# Patient Record
Sex: FEMALE | Race: WHITE | NOT HISPANIC OR LATINO | Employment: PART TIME
[De-identification: names, ages, dates, MRNs, and addresses within clinical notes are randomized per-mention and may not be internally consistent; named-entity substitution may affect disease eponyms.]

---

## 2019-01-01 ENCOUNTER — EXTERNAL RECORD (OUTPATIENT)
Dept: HEALTH INFORMATION MANAGEMENT | Facility: OTHER | Age: 29
End: 2019-01-01

## 2019-09-25 ENCOUNTER — OFFICE VISIT (OUTPATIENT)
Dept: CARDIOLOGY | Age: 29
End: 2019-09-25

## 2019-09-25 VITALS
WEIGHT: 139.66 LBS | HEART RATE: 73 BPM | HEIGHT: 66 IN | BODY MASS INDEX: 22.45 KG/M2 | SYSTOLIC BLOOD PRESSURE: 108 MMHG | DIASTOLIC BLOOD PRESSURE: 68 MMHG

## 2019-09-25 DIAGNOSIS — Z13.220 LIPID SCREENING: ICD-10-CM

## 2019-09-25 DIAGNOSIS — Q25.1 COARCTATION OF AORTA: ICD-10-CM

## 2019-09-25 DIAGNOSIS — Q23.1 BICUSPID AORTIC VALVE: Primary | ICD-10-CM

## 2019-09-25 PROCEDURE — 99204 OFFICE O/P NEW MOD 45 MIN: CPT | Performed by: INTERNAL MEDICINE

## 2019-09-25 PROCEDURE — 93000 ELECTROCARDIOGRAM COMPLETE: CPT | Performed by: INTERNAL MEDICINE

## 2019-09-25 SDOH — HEALTH STABILITY: PHYSICAL HEALTH: ON AVERAGE, HOW MANY DAYS PER WEEK DO YOU ENGAGE IN MODERATE TO STRENUOUS EXERCISE (LIKE A BRISK WALK)?: 3 DAYS

## 2019-09-25 SDOH — HEALTH STABILITY: PHYSICAL HEALTH: ON AVERAGE, HOW MANY MINUTES DO YOU ENGAGE IN EXERCISE AT THIS LEVEL?: 60 MIN

## 2019-09-25 ASSESSMENT — PATIENT HEALTH QUESTIONNAIRE - PHQ9
SUM OF ALL RESPONSES TO PHQ9 QUESTIONS 1 AND 2: 0
1. LITTLE INTEREST OR PLEASURE IN DOING THINGS: NOT AT ALL
2. FEELING DOWN, DEPRESSED OR HOPELESS: NOT AT ALL
SUM OF ALL RESPONSES TO PHQ9 QUESTIONS 1 AND 2: 0

## 2019-10-09 ENCOUNTER — HOSPITAL (OUTPATIENT)
Dept: OTHER | Age: 29
End: 2019-10-09
Attending: INTERNAL MEDICINE

## 2019-10-09 PROCEDURE — 93306 TTE W/DOPPLER COMPLETE: CPT | Performed by: INTERNAL MEDICINE

## 2019-10-24 ENCOUNTER — TELEPHONE (OUTPATIENT)
Dept: CARDIOLOGY | Age: 29
End: 2019-10-24

## 2019-10-30 ENCOUNTER — HOSPITAL (OUTPATIENT)
Dept: OTHER | Age: 29
End: 2019-10-30
Attending: OBSTETRICS & GYNECOLOGY

## 2019-10-30 PROCEDURE — 76811 OB US DETAILED SNGL FETUS: CPT | Performed by: OBSTETRICS & GYNECOLOGY

## 2019-10-30 PROCEDURE — 99244 OFF/OP CNSLTJ NEW/EST MOD 40: CPT | Performed by: OBSTETRICS & GYNECOLOGY

## 2019-10-30 PROCEDURE — 76817 TRANSVAGINAL US OBSTETRIC: CPT | Performed by: OBSTETRICS & GYNECOLOGY

## 2019-10-31 ENCOUNTER — TELEPHONE (OUTPATIENT)
Dept: CARDIOLOGY | Age: 29
End: 2019-10-31

## 2019-10-31 DIAGNOSIS — Q25.1 COARCTATION OF AORTA: Primary | ICD-10-CM

## 2019-10-31 DIAGNOSIS — Q23.1 BICUSPID AORTIC VALVE: ICD-10-CM

## 2019-11-13 ENCOUNTER — HOSPITAL (OUTPATIENT)
Dept: OTHER | Age: 29
End: 2019-11-13
Attending: NURSE PRACTITIONER

## 2019-11-18 DIAGNOSIS — Q23.1 BICUSPID AORTIC VALVE: ICD-10-CM

## 2019-11-18 DIAGNOSIS — Q25.1 COARCTATION OF AORTA: ICD-10-CM

## 2019-11-19 ENCOUNTER — TELEPHONE (OUTPATIENT)
Dept: CARDIOLOGY | Age: 29
End: 2019-11-19

## 2019-11-20 ENCOUNTER — HOSPITAL (OUTPATIENT)
Dept: OTHER | Age: 29
End: 2019-11-20
Attending: OBSTETRICS & GYNECOLOGY

## 2019-11-20 PROCEDURE — 76827 ECHO EXAM OF FETAL HEART: CPT | Performed by: PEDIATRICS

## 2019-11-20 PROCEDURE — 93325 DOPPLER ECHO COLOR FLOW MAPG: CPT | Performed by: PEDIATRICS

## 2019-11-20 PROCEDURE — 76825 ECHO EXAM OF FETAL HEART: CPT | Performed by: PEDIATRICS

## 2019-11-22 ENCOUNTER — HOSPITAL (OUTPATIENT)
Dept: OTHER | Age: 29
End: 2019-11-22
Attending: OBSTETRICS & GYNECOLOGY

## 2019-11-22 LAB
FETAL FIBRONECTIN: NEGATIVE
UA APPEAR: CLEAR
UA BACTERIA: ABNORMAL
UA BILI: NEGATIVE
UA BLOOD: ABNORMAL
UA COLOR: YELLOW
UA EPITHELIAL: ABNORMAL
UA GLUCOSE: NEGATIVE
UA KETONES: NEGATIVE
UA LEUK EST: ABNORMAL
UA NITRITE: NEGATIVE
UA PH: 7.5 (ref 5–7)
UA PROTEIN: NEGATIVE
UA RBC: ABNORMAL
UA SPEC GRAV: <=1.005 (ref 1.01–1.02)
UA UROBILINOGEN: 0.2 MG/DL (ref 0.2–1)
UA WBC: ABNORMAL

## 2019-11-25 ENCOUNTER — HOSPITAL (OUTPATIENT)
Dept: OTHER | Age: 29
End: 2019-11-25
Attending: OBSTETRICS & GYNECOLOGY

## 2019-11-25 PROCEDURE — 76816 OB US FOLLOW-UP PER FETUS: CPT | Performed by: OBSTETRICS & GYNECOLOGY

## 2019-11-25 PROCEDURE — 99213 OFFICE O/P EST LOW 20 MIN: CPT | Performed by: OBSTETRICS & GYNECOLOGY

## 2019-11-26 ENCOUNTER — OFFICE VISIT (OUTPATIENT)
Dept: PEDIATRIC CARDIOLOGY | Age: 29
End: 2019-11-26

## 2019-11-26 ENCOUNTER — ANCILLARY PROCEDURE (OUTPATIENT)
Dept: PEDIATRIC CARDIOLOGY | Age: 29
End: 2019-11-26
Attending: PEDIATRICS

## 2019-11-26 VITALS
SYSTOLIC BLOOD PRESSURE: 144 MMHG | OXYGEN SATURATION: 100 % | HEART RATE: 88 BPM | WEIGHT: 150.5 LBS | BODY MASS INDEX: 24.29 KG/M2 | DIASTOLIC BLOOD PRESSURE: 91 MMHG

## 2019-11-26 DIAGNOSIS — Q23.1 BICUSPID AORTIC VALVE: ICD-10-CM

## 2019-11-26 DIAGNOSIS — Q25.1 COARCTATION OF AORTA: Primary | ICD-10-CM

## 2019-11-26 LAB
AORTIC ROOT: 2.83 CM (ref 2.2–3.12)
AORTIC VALVE ANNULUS: 2.05 CM (ref 1.56–2.27)
FRACTIONAL SHORTENING: 29 % (ref 28–44)
LEFT VENTRICLE END SYSTOLIC SEPTAL THICKNESS: 1.09 CM
LEFT VENTRICULAR POSTERIOR WALL IN END DIASTOLE (LVPW): 0.68 CM (ref 0.48–0.92)
LEFT VENTRICULAR POSTERIOR WALL IN END SYSTOLE: 1.05 CM
LV SHORT-AXIS END-DIASTOLIC ENDOCARDIAL DIAMETER: 5.27 CM (ref 4.07–5.73)
LV SHORT-AXIS END-DIASTOLIC SEPTAL THICKNESS: 0.68 CM (ref 0.5–0.93)
LV SHORT-AXIS END-SYSTOLIC ENDOCARDIAL DIAMETER: 3.74 CM
LV THICKNESS:DIMENSION RATIO: 0.13 CM (ref 0.09–0.21)
RIGHT VENTRICULAR END DIASTOLIC DIAS: 1.2 CM
SINOTUBULAR JUNCTION: 2.24 CM (ref 1.78–2.59)
Z SCORE OF AORTIC VALVE ANNULUS PHN: 0.8 CM
Z SCORE OF LEFT VENTRICULAR POSTERIOR WALL IN END DIASTOLE: -0.2 CM
Z SCORE OF LV SHORT-AXIS END-DIASTOLIC ENDOCARDIAL DIAMETER: 0.9 CM
Z SCORE OF LV SHORT-AXIS END-DIASTOLIC SEPTAL THICKNESS: -0.3 CM
Z SCORE OF LV THICKNESS:DIMENSION RATIO: -0.7
Z-SCORE OF AORTIC ROOT: 0.7 CM
Z-SCORE OF SINOTUBULAR JUNCTION PHN: 0.3 CM

## 2019-11-26 PROCEDURE — 99243 OFF/OP CNSLTJ NEW/EST LOW 30: CPT | Performed by: PEDIATRICS

## 2019-11-26 PROCEDURE — 93304 ECHO TRANSTHORACIC: CPT | Performed by: PEDIATRICS

## 2019-11-26 PROCEDURE — 93325 DOPPLER ECHO COLOR FLOW MAPG: CPT | Performed by: PEDIATRICS

## 2019-11-26 PROCEDURE — 93321 DOPPLER ECHO F-UP/LMTD STD: CPT | Performed by: PEDIATRICS

## 2019-11-26 ASSESSMENT — ENCOUNTER SYMPTOMS
NEUROLOGICAL NEGATIVE: 1
CONSTITUTIONAL NEGATIVE: 1
RESPIRATORY NEGATIVE: 1
HEMATOLOGIC/LYMPHATIC NEGATIVE: 1
ALLERGIC/IMMUNOLOGIC NEGATIVE: 1
GASTROINTESTINAL NEGATIVE: 1
PSYCHIATRIC NEGATIVE: 1
ENDOCRINE NEGATIVE: 1
EYES NEGATIVE: 1

## 2019-12-03 ENCOUNTER — TELEPHONE (OUTPATIENT)
Dept: PEDIATRIC CARDIOLOGY | Age: 29
End: 2019-12-03

## 2019-12-16 ENCOUNTER — HOSPITAL (OUTPATIENT)
Dept: OTHER | Age: 29
End: 2019-12-16
Attending: OBSTETRICS & GYNECOLOGY

## 2019-12-16 LAB
UA APPEAR: ABNORMAL
UA BACTERIA: ABNORMAL
UA BILI: NEGATIVE
UA BLOOD: ABNORMAL
UA COLOR: ABNORMAL
UA EPITHELIAL: ABNORMAL
UA GLUCOSE: NEGATIVE
UA KETONES: NEGATIVE
UA LEUK EST: ABNORMAL
UA NITRITE: NEGATIVE
UA PH: 7 (ref 5–7)
UA PROTEIN: ABNORMAL
UA RBC: ABNORMAL
UA SPEC GRAV: 1.01 (ref 1.01–1.02)
UA UROBILINOGEN: 0.2 MG/DL (ref 0.2–1)
UA WBC: ABNORMAL

## 2019-12-23 ENCOUNTER — HOSPITAL (OUTPATIENT)
Dept: OTHER | Age: 29
End: 2019-12-23
Attending: OBSTETRICS & GYNECOLOGY

## 2019-12-23 PROCEDURE — 76816 OB US FOLLOW-UP PER FETUS: CPT | Performed by: OBSTETRICS & GYNECOLOGY

## 2019-12-23 PROCEDURE — 99213 OFFICE O/P EST LOW 20 MIN: CPT | Performed by: OBSTETRICS & GYNECOLOGY

## 2020-01-19 ENCOUNTER — HOSPITAL (OUTPATIENT)
Dept: OTHER | Age: 30
End: 2020-01-19
Attending: SPECIALIST

## 2020-01-20 ENCOUNTER — HOSPITAL (OUTPATIENT)
Dept: OTHER | Age: 30
End: 2020-01-20

## 2020-01-27 ENCOUNTER — ANCILLARY PROCEDURE (OUTPATIENT)
Dept: PEDIATRIC CARDIOLOGY | Age: 30
End: 2020-01-27
Attending: PEDIATRICS

## 2020-01-27 ENCOUNTER — OFFICE VISIT (OUTPATIENT)
Dept: PEDIATRIC CARDIOLOGY | Age: 30
End: 2020-01-27

## 2020-01-27 VITALS
WEIGHT: 158 LBS | OXYGEN SATURATION: 100 % | SYSTOLIC BLOOD PRESSURE: 119 MMHG | BODY MASS INDEX: 25.39 KG/M2 | HEART RATE: 93 BPM | RESPIRATION RATE: 21 BRPM | HEIGHT: 66 IN | DIASTOLIC BLOOD PRESSURE: 62 MMHG

## 2020-01-27 DIAGNOSIS — Q25.1 COARCTATION OF AORTA: ICD-10-CM

## 2020-01-27 DIAGNOSIS — Q23.1 BICUSPID AORTIC VALVE: Primary | ICD-10-CM

## 2020-01-27 LAB
AORTIC ROOT: 3.24 CM (ref 2.31–3.28)
AORTIC VALVE ANNULUS: 1.95 CM (ref 1.63–2.38)
FRACTIONAL SHORTENING: 34 % (ref 28–44)
LEFT VENTRICULAR POSTERIOR WALL IN END DIASTOLE (LVPW): 0.99 CM (ref 0.5–0.95)
LV SHORT-AXIS END-DIASTOLIC ENDOCARDIAL DIAMETER: 5.71 CM (ref 4.25–5.98)
LV SHORT-AXIS END-DIASTOLIC SEPTAL THICKNESS: 0.84 CM (ref 0.52–0.96)
LV SHORT-AXIS END-SYSTOLIC ENDOCARDIAL DIAMETER: 3.79 CM
LV THICKNESS:DIMENSION RATIO: 0.17 CM (ref 0.09–0.21)
LVOT 2D: 2 CM
RIGHT VENTRICULAR END DIASTOLIC DIAS: 1.33 CM
Z SCORE OF AORTIC VALVE ANNULUS PHN: -0.3 CM
Z SCORE OF LEFT VENTRICULAR POSTERIOR WALL IN END DIASTOLE: 2.3 CM
Z SCORE OF LV SHORT-AXIS END-DIASTOLIC ENDOCARDIAL DIAMETER: 1.4 CM
Z SCORE OF LV SHORT-AXIS END-DIASTOLIC SEPTAL THICKNESS: 0.9 CM
Z SCORE OF LV THICKNESS:DIMENSION RATIO: 0.8
Z-SCORE OF AORTIC ROOT: 1.8 CM

## 2020-01-27 PROCEDURE — 99215 OFFICE O/P EST HI 40 MIN: CPT | Performed by: PEDIATRICS

## 2020-01-27 PROCEDURE — 93000 ELECTROCARDIOGRAM COMPLETE: CPT | Performed by: PEDIATRICS

## 2020-01-27 PROCEDURE — 93304 ECHO TRANSTHORACIC: CPT | Performed by: PEDIATRICS

## 2020-01-27 PROCEDURE — 93321 DOPPLER ECHO F-UP/LMTD STD: CPT | Performed by: PEDIATRICS

## 2020-01-27 PROCEDURE — 93325 DOPPLER ECHO COLOR FLOW MAPG: CPT | Performed by: PEDIATRICS

## 2020-01-27 RX ORDER — NIFEDIPINE 10 MG/1
10 CAPSULE ORAL 4 TIMES DAILY
COMMUNITY
Start: 2020-01-22 | End: 2022-11-09 | Stop reason: ALTCHOICE

## 2020-01-27 ASSESSMENT — ENCOUNTER SYMPTOMS
GASTROINTESTINAL NEGATIVE: 1
EYES NEGATIVE: 1
PSYCHIATRIC NEGATIVE: 1
NEUROLOGICAL NEGATIVE: 1
RESPIRATORY NEGATIVE: 1
ENDOCRINE NEGATIVE: 1
ALLERGIC/IMMUNOLOGIC NEGATIVE: 1
CONSTITUTIONAL NEGATIVE: 1
HEMATOLOGIC/LYMPHATIC NEGATIVE: 1

## 2020-07-21 ENCOUNTER — TELEPHONE (OUTPATIENT)
Dept: PEDIATRIC CARDIOLOGY | Age: 30
End: 2020-07-21

## 2021-02-17 ENCOUNTER — OFFICE VISIT (OUTPATIENT)
Dept: PEDIATRIC CARDIOLOGY | Age: 31
End: 2021-02-17

## 2021-02-17 ENCOUNTER — ANCILLARY PROCEDURE (OUTPATIENT)
Dept: PEDIATRIC CARDIOLOGY | Age: 31
End: 2021-02-17
Attending: PEDIATRICS

## 2021-02-17 VITALS
DIASTOLIC BLOOD PRESSURE: 62 MMHG | BODY MASS INDEX: 20.54 KG/M2 | OXYGEN SATURATION: 99 % | WEIGHT: 127.8 LBS | SYSTOLIC BLOOD PRESSURE: 116 MMHG | HEART RATE: 60 BPM | HEIGHT: 66 IN

## 2021-02-17 DIAGNOSIS — Q25.1 COARCTATION OF AORTA: ICD-10-CM

## 2021-02-17 DIAGNOSIS — Q23.1 BICUSPID AORTIC VALVE: Primary | ICD-10-CM

## 2021-02-17 LAB
AORTIC ROOT: 3.02 CM (ref 2.18–3.1)
AORTIC VALVE ANNULUS: 1.97 CM (ref 1.54–2.25)
FRACTIONAL SHORTENING MMODE: 41 %
IVSS (M-MODE): 1.33 CM
LEFT VENTRICULAR POSTERIOR WALL IN END DIASTOLE MMODE: 0.68 CM (ref 0.48–0.91)
LEFT VENTRICULAR POSTERIOR WALL SYSTOLE MMODE: 1.26 CM
LV END-DIASTOLIC ENDOCARDIAL DIAMETER MMODE: 5.64 CM (ref 4.04–5.68)
LV END-DIASTOLIC SEPTAL THICKNESS MMODE: 0.68 CM (ref 0.49–0.92)
LVIDS BY MMODE: 3.33 CM
SINOTUBULAR JUNCTION: 2.46 CM (ref 1.76–2.57)
Z SCORE OF AORTIC VALVE ANNULUS PHN: 0.4 CM
Z SCORE OF LEFT VENTRICULAR POSTERIOR WALL IN END DIASTOLE MMODE: -0.1 CM
Z SCORE OF LV END-DIASTOLIC ENDOCARDIAL DIAMETER MMODE: 1.9 CM
Z SCORE OF LV END-DIASTOLIC SEPTAL THICKNESS MMODE: -0.2 CM
Z-SCORE OF AORTIC ROOT: 1.6 CM
Z-SCORE OF SINOTUBULAR JUNCTION PHN: 1.4 CM

## 2021-02-17 PROCEDURE — 93325 DOPPLER ECHO COLOR FLOW MAPG: CPT | Performed by: PEDIATRICS

## 2021-02-17 PROCEDURE — 99214 OFFICE O/P EST MOD 30 MIN: CPT | Performed by: PEDIATRICS

## 2021-02-17 PROCEDURE — 93321 DOPPLER ECHO F-UP/LMTD STD: CPT | Performed by: PEDIATRICS

## 2021-02-17 PROCEDURE — 93304 ECHO TRANSTHORACIC: CPT | Performed by: PEDIATRICS

## 2021-02-17 PROCEDURE — 93225 XTRNL ECG REC<48 HRS REC: CPT | Performed by: PEDIATRICS

## 2021-02-17 PROCEDURE — 93000 ELECTROCARDIOGRAM COMPLETE: CPT | Performed by: PEDIATRICS

## 2021-02-17 ASSESSMENT — ENCOUNTER SYMPTOMS
GASTROINTESTINAL NEGATIVE: 1
RESPIRATORY NEGATIVE: 1
HEMATOLOGIC/LYMPHATIC NEGATIVE: 1
PSYCHIATRIC NEGATIVE: 1
ALLERGIC/IMMUNOLOGIC NEGATIVE: 1
CONSTITUTIONAL NEGATIVE: 1
ENDOCRINE NEGATIVE: 1
EYES NEGATIVE: 1
NEUROLOGICAL NEGATIVE: 1

## 2021-02-18 ENCOUNTER — TELEPHONE (OUTPATIENT)
Dept: PEDIATRIC CARDIOLOGY | Age: 31
End: 2021-02-18

## 2021-02-18 DIAGNOSIS — Q23.1 BICUSPID AORTIC VALVE: Primary | ICD-10-CM

## 2021-02-18 DIAGNOSIS — Q25.1 COARCTATION OF AORTA: ICD-10-CM

## 2021-03-29 PROCEDURE — 93227 XTRNL ECG REC<48 HR R&I: CPT | Performed by: PEDIATRICS

## 2021-06-14 ENCOUNTER — IMMUNIZATION (OUTPATIENT)
Dept: LAB | Age: 31
End: 2021-06-14

## 2021-06-14 DIAGNOSIS — Z23 NEED FOR VACCINATION: Primary | ICD-10-CM

## 2021-06-14 PROCEDURE — 91301 COVID-19 MODERNA VACCINE: CPT | Performed by: HOSPITALIST

## 2021-06-14 PROCEDURE — 0011A COVID-19 MODERNA VACCINE: CPT | Performed by: HOSPITALIST

## 2021-07-12 ENCOUNTER — IMMUNIZATION (OUTPATIENT)
Dept: LAB | Age: 31
End: 2021-07-12
Attending: HOSPITALIST

## 2021-07-12 DIAGNOSIS — Z23 NEED FOR VACCINATION: Primary | ICD-10-CM

## 2021-07-12 PROCEDURE — 91301 COVID-19 MODERNA VACCINE: CPT

## 2021-07-12 PROCEDURE — 0012A COVID-19 MODERNA VACCINE: CPT

## 2021-10-27 ENCOUNTER — HOSPITAL ENCOUNTER (OUTPATIENT)
Dept: MRI IMAGING | Age: 31
Discharge: HOME | End: 2021-10-27
Attending: PEDIATRICS

## 2021-10-27 DIAGNOSIS — Q25.1 COARCTATION OF AORTA: ICD-10-CM

## 2021-10-27 DIAGNOSIS — Q23.1 BICUSPID AORTIC VALVE: ICD-10-CM

## 2021-10-27 PROCEDURE — 10002805 HB CONTRAST AGENT: Performed by: PEDIATRICS

## 2021-10-27 PROCEDURE — A9585 GADOBUTROL INJECTION: HCPCS | Performed by: PEDIATRICS

## 2021-10-27 PROCEDURE — 75561 CARDIAC MRI FOR MORPH W/DYE: CPT

## 2021-10-27 PROCEDURE — 75561 CARDIAC MRI FOR MORPH W/DYE: CPT | Performed by: PEDIATRICS

## 2021-10-27 PROCEDURE — 71555 MRI ANGIO CHEST W OR W/O DYE: CPT

## 2021-10-27 PROCEDURE — 71555 MRI ANGIO CHEST W OR W/O DYE: CPT | Performed by: PEDIATRICS

## 2021-10-27 RX ORDER — GADOBUTROL 604.72 MG/ML
7.5 INJECTION INTRAVENOUS ONCE
Status: COMPLETED | OUTPATIENT
Start: 2021-10-27 | End: 2021-10-27

## 2021-10-27 RX ADMIN — GADOBUTROL 5 ML: 604.72 INJECTION INTRAVENOUS at 10:52

## 2021-11-04 ENCOUNTER — TELEPHONE (OUTPATIENT)
Dept: PEDIATRIC CARDIOLOGY | Age: 31
End: 2021-11-04

## 2021-11-11 ENCOUNTER — TELEPHONE (OUTPATIENT)
Dept: PEDIATRIC CARDIOLOGY | Age: 31
End: 2021-11-11

## 2021-12-23 ENCOUNTER — TELEPHONE (OUTPATIENT)
Dept: FAMILY MEDICINE CLINIC | Facility: CLINIC | Age: 31
End: 2021-12-23

## 2021-12-23 ENCOUNTER — OFFICE VISIT (OUTPATIENT)
Dept: FAMILY MEDICINE CLINIC | Facility: CLINIC | Age: 31
End: 2021-12-23
Payer: COMMERCIAL

## 2021-12-23 VITALS
HEART RATE: 96 BPM | OXYGEN SATURATION: 96 % | RESPIRATION RATE: 16 BRPM | HEIGHT: 66 IN | DIASTOLIC BLOOD PRESSURE: 60 MMHG | SYSTOLIC BLOOD PRESSURE: 98 MMHG | BODY MASS INDEX: 21.18 KG/M2 | WEIGHT: 131.81 LBS

## 2021-12-23 DIAGNOSIS — M54.2 NECK PAIN: ICD-10-CM

## 2021-12-23 DIAGNOSIS — G44.209 TENSION HEADACHE: ICD-10-CM

## 2021-12-23 DIAGNOSIS — Q25.1 COARCTATION OF AORTA: ICD-10-CM

## 2021-12-23 DIAGNOSIS — G43.109 OCULAR MIGRAINE: Primary | ICD-10-CM

## 2021-12-23 DIAGNOSIS — Z00.00 ADULT GENERAL MEDICAL EXAMINATION: ICD-10-CM

## 2021-12-23 DIAGNOSIS — Z23 ENCOUNTER FOR IMMUNIZATION: ICD-10-CM

## 2021-12-23 PROCEDURE — 90686 IIV4 VACC NO PRSV 0.5 ML IM: CPT | Performed by: NURSE PRACTITIONER

## 2021-12-23 PROCEDURE — 99204 OFFICE O/P NEW MOD 45 MIN: CPT | Performed by: NURSE PRACTITIONER

## 2021-12-23 PROCEDURE — 3074F SYST BP LT 130 MM HG: CPT | Performed by: NURSE PRACTITIONER

## 2021-12-23 PROCEDURE — 90471 IMMUNIZATION ADMIN: CPT | Performed by: NURSE PRACTITIONER

## 2021-12-23 PROCEDURE — 3078F DIAST BP <80 MM HG: CPT | Performed by: NURSE PRACTITIONER

## 2021-12-23 PROCEDURE — 3008F BODY MASS INDEX DOCD: CPT | Performed by: NURSE PRACTITIONER

## 2021-12-23 RX ORDER — NAPROXEN 500 MG/1
500 TABLET ORAL 2 TIMES DAILY WITH MEALS
Qty: 28 TABLET | Refills: 0 | Status: SHIPPED | OUTPATIENT
Start: 2021-12-23 | End: 2022-01-06

## 2021-12-23 RX ORDER — ACETAMINOPHEN 500 MG
500 TABLET ORAL
COMMUNITY
Start: 2020-02-15

## 2021-12-23 RX ORDER — ETONOGESTREL 68 MG/1
IMPLANT SUBCUTANEOUS
COMMUNITY

## 2021-12-23 NOTE — PROGRESS NOTES
Chief Complaint:   Patient presents with:  Headache: started school online Octob 2021,  headaches began soon after. Then also experiencing blurry vision that will later turn into a headache.         HPI:   This is a 36year old female coming in to establish Grandmother    • Other (ALZHEIMER) Maternal Grandfather    • No Known Problems Paternal Grandmother    • Other (EMPHYSEMA-SMOKER) Paternal Grandfather      Allergies:  No Known Allergies  Current Meds:  Current Outpatient Medications   Medication Sig Dispe discharge Ears: External normal, TM clear bilaterally. Nose: patent, no nasal discharge. Throat:  No tonsillar erythema or exudate. Mouth:  No oral lesions, good dentition. NECK: Supple, no CLAD, no thyromegaly.  Trapezius muscles BL very tight on palpati COMP METABOLIC PANEL (14); Future  - LIPID PANEL; Future  - HEMOGLOBIN A1C; Future  - ASSAY, THYROID STIM HORMONE; Future  - OPHTHALMOLOGY - INTERNAL    6.  Encounter for immunization  - FLULAVAL INFLUENZA VACCINE QUAD PRESERVATIVE FREE 0.5 ML      Meds & R

## 2021-12-23 NOTE — TELEPHONE ENCOUNTER
Appt notes: HA and eye pain. I spoke with pt, looking to establish care with a provider. At the time she scheduled appt 12/13, was having a lot of HA with eye pain, blurred spots in vision, light sensitivity.  HA started prior to Thanksgiving, around [facility]

## 2021-12-23 NOTE — PATIENT INSTRUCTIONS
Quadruped Arm-Reach Exercise       Do this exercise on your hands and knees. Keep your knees under your hips and your hands under your shoulders. Keep your spine in a neutral position (not arched or sagging). Keep your ears in line with your shoulders.  H elbows toward your buttocks. Hold for up to 5 seconds, then return to starting position.   · Repeat 3 times, or as directed by your healthcare provider or physical therapist.  Meaghan Pope last reviewed this educational content on 7/1/2020  © [id number] The [employer] professional's instructions. Neck Exercises: Active Neck Rotation    To start, lie on your back, knees bent and feet flat on the floor. Keep your ears, shoulders, and hips aligned, but don’t press your lower back to the floor.  Rest your hands on you instructions. Neck Exercises: Head Lifts    Do this exercise on your hands and knees. Keep your knees under your hips and your hands under your shoulders. Keep your spine in a neutral position (not arched or sagging).  Keep your ears in line with you your lower back. Place your right palm on the top of your head. · Gently pull your head forward and down until you feel a stretch in the muscles in the back of your neck. Don’t force the motion. · Hold for 20 seconds, then return to starting position.  Sw symptoms (or aura) such as flashes of light, blind spots or other vision changes, warns you a headache is coming on.  Nausea and vomiting, lightheadedness, sensitivity to light or sound, and other visual disturbances are common migraine symptoms. The pain m and meditation. Talk with your healthcare provider to find out more information about managing stress. Many books and digital media are also available on this subject. Tyramine is a substance found in many foods. It can trigger a migraine in some people. you get a bad headache. This may keep you from having to visit the emergency department in the future. You may need to see a headache specialist (neurologist) if you continue to have headaches.   When to seek medical advice  Call your healthcare provider ri often along with nausea. You may be highly sensitive to light, sound, and odors. Vomiting or diarrhea may also happen. This stage lasts 4 to 72 hours. · Postdrome. After your headache ends, you may feel tired, achy, and \"washed out. \" This may last for a migraines. A migraine is a type of severe headache. Having a migraine can be very painful. But there are steps you can take to help prevent migraines.    Medicines to help prevent migraines   · Your healthcare provider may prescribe certain medicines to h substitute for professional medical care. Always follow your healthcare professional's instructions. Preventing Migraine Headaches: Triggers  The first step in preventing migraines is to learn what triggers them.  You may then be able to control your avoided. For example, make sure to get enough rest and drink plenty of water while you're traveling. Make sure to carry a hat, sunglasses, and your medicines. Be alert for migraine symptoms, so you can treat a migraine early if it happens.   Ra last r

## 2021-12-28 ENCOUNTER — TELEPHONE (OUTPATIENT)
Dept: FAMILY MEDICINE CLINIC | Facility: CLINIC | Age: 31
End: 2021-12-28

## 2021-12-28 NOTE — TELEPHONE ENCOUNTER
Received vm from pt, said she was referred to ophthalmology but soonest available is March. Wants to know if OK to wait that long. Also said she has an aura with ocular migraines.  Also, wants to double check if OK to take naproxen while breast feeding, berry

## 2022-04-20 ENCOUNTER — TELEPHONE (OUTPATIENT)
Dept: PEDIATRIC CARDIOLOGY | Age: 32
End: 2022-04-20

## 2022-11-09 ENCOUNTER — APPOINTMENT (OUTPATIENT)
Dept: CT IMAGING | Age: 32
End: 2022-11-09

## 2022-11-09 ENCOUNTER — HOSPITAL ENCOUNTER (EMERGENCY)
Age: 32
Discharge: HOME | End: 2022-11-09
Attending: EMERGENCY MEDICINE

## 2022-11-09 VITALS
DIASTOLIC BLOOD PRESSURE: 66 MMHG | HEART RATE: 65 BPM | HEIGHT: 66 IN | SYSTOLIC BLOOD PRESSURE: 123 MMHG | RESPIRATION RATE: 18 BRPM | OXYGEN SATURATION: 99 % | BODY MASS INDEX: 20.63 KG/M2 | TEMPERATURE: 99.1 F

## 2022-11-09 DIAGNOSIS — R11.11 VOMITING WITHOUT NAUSEA, UNSPECIFIED VOMITING TYPE: ICD-10-CM

## 2022-11-09 DIAGNOSIS — S09.90XA INJURY OF HEAD, INITIAL ENCOUNTER: Primary | ICD-10-CM

## 2022-11-09 DIAGNOSIS — G44.319 ACUTE POST-TRAUMATIC HEADACHE, NOT INTRACTABLE: ICD-10-CM

## 2022-11-09 LAB — HCG UR QL: NEGATIVE

## 2022-11-09 PROCEDURE — 10002803 HB RX 637: Performed by: PHYSICIAN ASSISTANT

## 2022-11-09 PROCEDURE — 84703 CHORIONIC GONADOTROPIN ASSAY: CPT

## 2022-11-09 PROCEDURE — 96372 THER/PROPH/DIAG INJ SC/IM: CPT

## 2022-11-09 PROCEDURE — 99284 EMERGENCY DEPT VISIT MOD MDM: CPT

## 2022-11-09 PROCEDURE — 70450 CT HEAD/BRAIN W/O DYE: CPT

## 2022-11-09 PROCEDURE — 10002800 HB RX 250 W HCPCS: Performed by: PHYSICIAN ASSISTANT

## 2022-11-09 RX ORDER — SPIRONOLACTONE 50 MG/1
150 TABLET, FILM COATED ORAL
COMMUNITY

## 2022-11-09 RX ORDER — BUTALBITAL, ACETAMINOPHEN AND CAFFEINE 50; 325; 40 MG/1; MG/1; MG/1
1 TABLET ORAL EVERY 4 HOURS PRN
Status: DISCONTINUED | OUTPATIENT
Start: 2022-11-09 | End: 2022-11-09 | Stop reason: HOSPADM

## 2022-11-09 RX ORDER — ONDANSETRON 4 MG/1
4 TABLET, ORALLY DISINTEGRATING ORAL EVERY 8 HOURS PRN
Qty: 12 TABLET | Refills: 0 | Status: SHIPPED | OUTPATIENT
Start: 2022-11-09

## 2022-11-09 RX ORDER — ETONOGESTREL 68 MG/1
IMPLANT SUBCUTANEOUS
COMMUNITY

## 2022-11-09 RX ORDER — BUTALBITAL, ACETAMINOPHEN AND CAFFEINE 50; 325; 40 MG/1; MG/1; MG/1
1 TABLET ORAL EVERY 6 HOURS PRN
Qty: 12 TABLET | Refills: 0 | Status: SHIPPED | OUTPATIENT
Start: 2022-11-09

## 2022-11-09 RX ADMIN — BUTALBITAL, ACETAMINOPHEN AND CAFFEINE 1 TABLET: 50; 325; 40 TABLET ORAL at 15:06

## 2022-11-09 RX ADMIN — KETOROLAC TROMETHAMINE 30 MG: 30 INJECTION, SOLUTION INTRAMUSCULAR at 15:06

## 2022-11-09 ASSESSMENT — ENCOUNTER SYMPTOMS
BACK PAIN: 0
NAUSEA: 1
VOMITING: 1
ABDOMINAL PAIN: 0
SHORTNESS OF BREATH: 0
HEADACHES: 1
DIZZINESS: 1
CHILLS: 1
FEVER: 0

## 2022-11-09 ASSESSMENT — MOVEMENT AND STRENGTH ASSESSMENTS: ALL_EXTREMITIES: EQUAL STRENGTH/TONE/MOVEMENT

## 2022-11-09 ASSESSMENT — PAIN SCALES - GENERAL: PAINLEVEL_OUTOF10: 6

## 2023-07-03 ENCOUNTER — OFFICE VISIT (OUTPATIENT)
Dept: FAMILY MEDICINE CLINIC | Facility: CLINIC | Age: 33
End: 2023-07-03
Payer: COMMERCIAL

## 2023-07-03 VITALS
DIASTOLIC BLOOD PRESSURE: 80 MMHG | WEIGHT: 134.19 LBS | OXYGEN SATURATION: 98 % | TEMPERATURE: 98 F | HEART RATE: 90 BPM | SYSTOLIC BLOOD PRESSURE: 122 MMHG | HEIGHT: 66 IN | BODY MASS INDEX: 21.57 KG/M2 | RESPIRATION RATE: 16 BRPM

## 2023-07-03 DIAGNOSIS — R00.2 PALPITATIONS: ICD-10-CM

## 2023-07-03 DIAGNOSIS — Q25.1 COARCTATION OF AORTA: ICD-10-CM

## 2023-07-03 DIAGNOSIS — F41.8 SITUATIONAL ANXIETY: ICD-10-CM

## 2023-07-03 DIAGNOSIS — Z00.00 ADULT GENERAL MEDICAL EXAMINATION: Primary | ICD-10-CM

## 2023-07-03 RX ORDER — TRIFAROTENE 50 UG/G
CREAM TOPICAL
COMMUNITY

## 2023-07-03 RX ORDER — DAPSONE 50 MG/G
GEL TOPICAL 2 TIMES DAILY
COMMUNITY

## 2023-11-13 ENCOUNTER — NURSE TRIAGE (OUTPATIENT)
Dept: FAMILY MEDICINE CLINIC | Facility: CLINIC | Age: 33
End: 2023-11-13

## 2023-11-13 NOTE — TELEPHONE ENCOUNTER
S/w Betsy stated right wrist pain x 2-3 months. Pt denied injury. Scheduled with Dr. Neil Rodriguez ( no Kristian Martinez appmartita) 11/15/23 at 4: 30 PM. Pt advised for severe pain, swelling or weakness to go to the ED. Pt voiced understanding. Reason for Disposition   MODERATE pain (e.g., interferes with normal activities) and present > 3 days    Answer Assessment - Initial Assessment Questions  1. ONSET: \"When did the pain start? \"      2-3 months  2. LOCATION: \"Where is the pain located? \"      Right wrist  3. PAIN: \"How bad is the pain? \" (Scale 1-10; or mild, moderate, severe)    - MILD (1-3): doesn't interfere with normal activities    - MODERATE (4-7): interferes with normal activities (e.g., work or school) or awakens from sleep    - SEVERE (8-10): excruciating pain, unable to use hand at all      2-3/10, trigger 5/10  4. WORK OR EXERCISE: \"Has there been any recent work or exercise that involved this part (i.e., hand or wrist) of the body? \"      Mousing hand and s/p using weed whacker  5. CAUSE: \"What do you think is causing the pain? \"      Unsure  6. AGGRAVATING FACTORS: \"What makes the pain worse? \" (e.g., using computer)      Moving wrist, stirring in pot or moving it excessively. 7. OTHER SYMPTOMS: \"Do you have any other symptoms? \" (e.g., neck pain, swelling, rash, numbness, fever)      Slight swelling  8. PREGNANCY: \"Is there any chance you are pregnant? \" \"When was your last menstrual period? \"      Denied; LMP: 11/2-11/9/23 abnormal    Protocols used: Hand and Wrist Pain-A-[place]

## 2023-11-15 ENCOUNTER — OFFICE VISIT (OUTPATIENT)
Dept: FAMILY MEDICINE CLINIC | Facility: CLINIC | Age: 33
End: 2023-11-15
Payer: COMMERCIAL

## 2023-11-15 VITALS
SYSTOLIC BLOOD PRESSURE: 102 MMHG | WEIGHT: 134.19 LBS | OXYGEN SATURATION: 99 % | HEART RATE: 77 BPM | DIASTOLIC BLOOD PRESSURE: 72 MMHG | BODY MASS INDEX: 21.57 KG/M2 | HEIGHT: 66 IN

## 2023-11-15 DIAGNOSIS — Z23 ENCOUNTER FOR IMMUNIZATION: ICD-10-CM

## 2023-11-15 DIAGNOSIS — M65.4 TENOSYNOVITIS, DE QUERVAIN: Primary | ICD-10-CM

## 2023-11-15 DIAGNOSIS — M25.531 WRIST PAIN, RIGHT: ICD-10-CM

## 2023-11-15 PROCEDURE — 3008F BODY MASS INDEX DOCD: CPT | Performed by: STUDENT IN AN ORGANIZED HEALTH CARE EDUCATION/TRAINING PROGRAM

## 2023-11-15 PROCEDURE — 90471 IMMUNIZATION ADMIN: CPT | Performed by: STUDENT IN AN ORGANIZED HEALTH CARE EDUCATION/TRAINING PROGRAM

## 2023-11-15 PROCEDURE — 3078F DIAST BP <80 MM HG: CPT | Performed by: STUDENT IN AN ORGANIZED HEALTH CARE EDUCATION/TRAINING PROGRAM

## 2023-11-15 PROCEDURE — 3074F SYST BP LT 130 MM HG: CPT | Performed by: STUDENT IN AN ORGANIZED HEALTH CARE EDUCATION/TRAINING PROGRAM

## 2023-11-15 PROCEDURE — 99213 OFFICE O/P EST LOW 20 MIN: CPT | Performed by: STUDENT IN AN ORGANIZED HEALTH CARE EDUCATION/TRAINING PROGRAM

## 2023-11-15 PROCEDURE — 90686 IIV4 VACC NO PRSV 0.5 ML IM: CPT | Performed by: STUDENT IN AN ORGANIZED HEALTH CARE EDUCATION/TRAINING PROGRAM

## 2023-11-21 ENCOUNTER — TELEPHONE (OUTPATIENT)
Dept: PEDIATRIC CARDIOLOGY | Age: 33
End: 2023-11-21

## 2023-11-21 ENCOUNTER — PATIENT MESSAGE (OUTPATIENT)
Dept: FAMILY MEDICINE CLINIC | Facility: CLINIC | Age: 33
End: 2023-11-21

## 2023-11-21 DIAGNOSIS — R79.89 ELEVATED LFTS: Primary | ICD-10-CM

## 2023-11-21 LAB
ABSOLUTE BASOPHILS: 28 CELLS/UL (ref 0–200)
ABSOLUTE EOSINOPHILS: 37 CELLS/UL (ref 15–500)
ABSOLUTE LYMPHOCYTES: 2916 CELLS/UL (ref 850–3900)
ABSOLUTE MONOCYTES: 552 CELLS/UL (ref 200–950)
ABSOLUTE NEUTROPHILS: 5667 CELLS/UL (ref 1500–7800)
ALBUMIN/GLOBULIN RATIO: 1.9 (CALC) (ref 1–2.5)
ALBUMIN: 4.9 G/DL (ref 3.6–5.1)
ALKALINE PHOSPHATASE: 37 U/L (ref 31–125)
ALT: 29 U/L (ref 6–29)
AST: 40 U/L (ref 10–30)
BASOPHILS: 0.3 %
BILIRUBIN, TOTAL: 1.6 MG/DL (ref 0.2–1.2)
BUN: 12 MG/DL (ref 7–25)
CALCIUM: 9.9 MG/DL (ref 8.6–10.2)
CARBON DIOXIDE: 27 MMOL/L (ref 20–32)
CHLORIDE: 103 MMOL/L (ref 98–110)
CHOL/HDLC RATIO: 2.4 (CALC)
CHOLESTEROL, TOTAL: 167 MG/DL
CREATININE: 0.89 MG/DL (ref 0.5–0.97)
EGFR: 89 ML/MIN/1.73M2
EOSINOPHILS: 0.4 %
GLOBULIN: 2.6 G/DL (CALC) (ref 1.9–3.7)
GLUCOSE: 75 MG/DL (ref 65–99)
HDL CHOLESTEROL: 69 MG/DL
HEMATOCRIT: 39.8 % (ref 35–45)
HEMOGLOBIN A1C: 4.8 % OF TOTAL HGB
HEMOGLOBIN: 13.9 G/DL (ref 11.7–15.5)
LDL-CHOLESTEROL: 84 MG/DL (CALC)
LYMPHOCYTES: 31.7 %
MCH: 30.7 PG (ref 27–33)
MCHC: 34.9 G/DL (ref 32–36)
MCV: 87.9 FL (ref 80–100)
MONOCYTES: 6 %
MPV: 10.7 FL (ref 7.5–12.5)
NEUTROPHILS: 61.6 %
NON-HDL CHOLESTEROL: 98 MG/DL (CALC)
PLATELET COUNT: 267 THOUSAND/UL (ref 140–400)
POTASSIUM: 4 MMOL/L (ref 3.5–5.3)
PROTEIN, TOTAL: 7.5 G/DL (ref 6.1–8.1)
RDW: 12.1 % (ref 11–15)
RED BLOOD CELL COUNT: 4.53 MILLION/UL (ref 3.8–5.1)
SODIUM: 139 MMOL/L (ref 135–146)
TRIGLYCERIDES: 60 MG/DL
TSH W/REFLEX TO FT4: 1.72 MIU/L
WHITE BLOOD CELL COUNT: 9.2 THOUSAND/UL (ref 3.8–10.8)

## 2023-12-02 ENCOUNTER — HOSPITAL ENCOUNTER (OUTPATIENT)
Dept: GENERAL RADIOLOGY | Facility: HOSPITAL | Age: 33
Discharge: HOME | End: 2023-12-02
Attending: STUDENT IN AN ORGANIZED HEALTH CARE EDUCATION/TRAINING PROGRAM
Payer: COMMERCIAL

## 2023-12-02 DIAGNOSIS — M25.531 WRIST PAIN, RIGHT: ICD-10-CM

## 2023-12-02 PROCEDURE — 73110 X-RAY EXAM OF WRIST: CPT | Performed by: STUDENT IN AN ORGANIZED HEALTH CARE EDUCATION/TRAINING PROGRAM

## 2023-12-28 PROBLEM — D03.72 MELANOMA IN SITU OF LEFT LOWER EXTREMITY INCLUDING HIP (HCC): Status: ACTIVE | Noted: 2023-12-28

## 2023-12-29 ENCOUNTER — NURSE TRIAGE (OUTPATIENT)
Dept: FAMILY MEDICINE CLINIC | Facility: CLINIC | Age: 33
End: 2023-12-29

## 2023-12-29 NOTE — TELEPHONE ENCOUNTER
S/w Sumi who stated after completion of final doxycyline dose she has been experiencing burning and a lump sensation lower throat and upper chest. Pt believes this is acid reflux. Pt denied chest pain or shortness of breath. Pt denied any other symptoms.      Pt advised if have worsening symptoms including throat tightness, SOB, chest pain or inability to swallow to seek ED evaluation.     Pt advised if have worsening symptoms prior to this appt then go to the .     No in-person appt until 1/3/24. Scheduled 1/3/24 at 1 PM.      Reason for Disposition   Sore throat is main symptom and persists > 48 hours    Answer Assessment - Initial Assessment Questions  1. ONSET: \"When did the throat start hurting?\" (Hours or days ago)       12/25/23  2. SEVERITY: \"How bad is the sore throat?\" (Scale 1-10; mild, moderate or severe)    - MILD (1-3):  doesn't interfere with eating or normal activities    - MODERATE (4-7): interferes with eating some solids and normal activities    - SEVERE (8-10):  excruciating pain, interferes with most normal activities    - SEVERE DYSPHAGIA: can't swallow liquids, drooling      4-5/10 but when swallow 8/10  3. STREP EXPOSURE: \"Has there been any exposure to strep within the past week?\" If Yes, ask: \"What type of contact occurred?\"       Denied  4.  VIRAL SYMPTOMS: \"Are there any symptoms of a cold, such as a runny nose, cough, hoarse voice or red eyes?\"       Denied  5. FEVER: \"Do you have a fever?\" If Yes, ask: \"What is your temperature, how was it measured, and when did it start?\"      Denied  6. PUS ON THE TONSILS: \"Is there pus on the tonsils in the back of your throat?\"      N/A  7. OTHER SYMPTOMS: \"Do you have any other symptoms?\" (e.g., difficulty breathing, headache, rash)      Burning base of throat radiated to downward.  8. PREGNANCY: \"Is there any chance you are pregnant?\" \"When was your last menstrual period?\"      Denied LMP: 12/28/23    Protocols used: Sore Throat-A-OH

## 2024-01-03 ENCOUNTER — OFFICE VISIT (OUTPATIENT)
Dept: FAMILY MEDICINE CLINIC | Facility: CLINIC | Age: 34
End: 2024-01-03
Payer: COMMERCIAL

## 2024-01-03 VITALS
WEIGHT: 129 LBS | DIASTOLIC BLOOD PRESSURE: 78 MMHG | HEIGHT: 66 IN | OXYGEN SATURATION: 99 % | BODY MASS INDEX: 20.73 KG/M2 | HEART RATE: 81 BPM | SYSTOLIC BLOOD PRESSURE: 108 MMHG

## 2024-01-03 DIAGNOSIS — K21.9 GASTROESOPHAGEAL REFLUX DISEASE WITHOUT ESOPHAGITIS: Primary | ICD-10-CM

## 2024-01-03 PROCEDURE — 99213 OFFICE O/P EST LOW 20 MIN: CPT | Performed by: STUDENT IN AN ORGANIZED HEALTH CARE EDUCATION/TRAINING PROGRAM

## 2024-01-03 PROCEDURE — 3078F DIAST BP <80 MM HG: CPT | Performed by: STUDENT IN AN ORGANIZED HEALTH CARE EDUCATION/TRAINING PROGRAM

## 2024-01-03 PROCEDURE — 3008F BODY MASS INDEX DOCD: CPT | Performed by: STUDENT IN AN ORGANIZED HEALTH CARE EDUCATION/TRAINING PROGRAM

## 2024-01-03 PROCEDURE — 3074F SYST BP LT 130 MM HG: CPT | Performed by: STUDENT IN AN ORGANIZED HEALTH CARE EDUCATION/TRAINING PROGRAM

## 2024-01-03 RX ORDER — SPIRONOLACTONE 50 MG/1
TABLET, FILM COATED ORAL
COMMUNITY
Start: 2023-12-09

## 2024-01-03 RX ORDER — OMEPRAZOLE 20 MG/1
20 CAPSULE, DELAYED RELEASE ORAL EVERY MORNING
Qty: 30 CAPSULE | Refills: 0 | Status: SHIPPED | OUTPATIENT
Start: 2024-01-03

## 2024-01-03 NOTE — PROGRESS NOTES
Subjective:   Sumi Guzman is a 33 year old female who presents for Sore Throat     33-year-old female complains of discomfort in esophageal area.  Recently saw dermatology, had 2 biopsies done (1 diagnosed with melanoma in situ) and was started on doxycycline prophylaxis.  Patient has also been alternating Tylenol and Motrin for pain postbiopsy.  Throughout the time of taking medicines patient noted acid reflux after eating that was at its worst when completing the course of antibiotics.  Tried to use more pillows at night to help with symptoms.  Since completing course of antibiotics on 12/25/2023 has been noting some improvement.  Trialed Gaviscon which helped relieve some of the symptoms.  Denies vomiting.    History/Other:    Chief Complaint Reviewed and Verified  No Further Nursing Notes to   Review  Tobacco Reviewed  Allergies Reviewed  Medications Reviewed    Problem List Reviewed  Medical History Reviewed  Surgical History   Reviewed  Family History Reviewed  Social History Reviewed         Tobacco:  She has never smoked tobacco.    Current Outpatient Medications   Medication Sig Dispense Refill    mupirocin 2 % External Ointment APPLY THIS ANTIBIOTIC TWICE A DAY TO SURGERY WOUND ON THE SCALP AND TOE      spironolactone 50 MG Oral Tab TAKE ONE BY MOUTH EVERY DAY X 2 WEEKS THEN INCREASE TO 2 BY MOUTH EVERY DAY      omeprazole 20 MG Oral Capsule Delayed Release Take 1 capsule (20 mg total) by mouth every morning. 30 capsule 0    Trifarotene (AKLIEF) 0.005 % External Cream       Dapsone 5 % External Gel Apply topically 2 (two) times daily.      Multiple Vitamin (MULTIVITAMIN ADULT OR) Take by mouth.      acetaminophen 500 MG Oral Tab 1 tablet (500 mg total).      Etonogestrel (NEXPLANON) 68 MG Subcutaneous Implant Nexplanon 68 mg subdermal implant           Review of Systems:  Review of Systems   Constitutional:  Negative for chills, diaphoresis and fever.   HENT:  Negative for congestion, ear  discharge, ear pain, sinus pressure, sinus pain and sore throat.    Eyes:  Negative for pain and discharge.   Respiratory:  Negative for cough, chest tightness, shortness of breath and wheezing.    Cardiovascular:  Negative for chest pain and palpitations.   Gastrointestinal:  Negative for abdominal pain, diarrhea, nausea and vomiting.        Acid reflux.   Endocrine: Negative for cold intolerance and heat intolerance.   Genitourinary:  Negative for dysuria, flank pain, frequency and urgency.   Musculoskeletal:  Negative for joint swelling.   Skin:  Negative for rash.   Neurological:  Negative for dizziness, syncope and headaches.   Psychiatric/Behavioral:  Negative for confusion and hallucinations.        Objective:   /78   Pulse 81   Ht 5' 6\" (1.676 m)   Wt 129 lb (58.5 kg)   LMP 12/28/2023 (Exact Date)   SpO2 99%   BMI 20.82 kg/m²  Estimated body mass index is 20.82 kg/m² as calculated from the following:    Height as of this encounter: 5' 6\" (1.676 m).    Weight as of this encounter: 129 lb (58.5 kg).  Physical Exam  Constitutional:       General: She is not in acute distress.     Appearance: Normal appearance. She is not ill-appearing or toxic-appearing.   HENT:      Head: Normocephalic and atraumatic.      Mouth/Throat:      Mouth: Mucous membranes are moist.      Pharynx: Oropharynx is clear. No oropharyngeal exudate or posterior oropharyngeal erythema.   Cardiovascular:      Rate and Rhythm: Normal rate and regular rhythm.      Heart sounds: Normal heart sounds. No murmur heard.     No gallop.   Pulmonary:      Effort: Pulmonary effort is normal. No respiratory distress.      Breath sounds: Normal breath sounds. No stridor. No wheezing, rhonchi or rales.   Abdominal:      General: Bowel sounds are normal.      Palpations: Abdomen is soft.      Tenderness: There is no abdominal tenderness. There is no guarding.   Skin:     General: Skin is warm and dry.   Neurological:      General: No focal  deficit present.      Mental Status: She is alert and oriented to person, place, and time. Mental status is at baseline.   Psychiatric:         Mood and Affect: Mood normal.         Behavior: Behavior normal.         Thought Content: Thought content normal.         Judgment: Judgment normal.         Assessment & Plan:     1. Gastroesophageal reflux disease without esophagitis (Primary)  Patient was recently on antibiotics (doxycycline) and using NSAIDs status post biopsies through dermatology.  -PPI for 2 to 4 weeks  -Dietary changes discussed  -Counseled patient about worrisome signs and symptoms that would prompt follow-up.  -     Omeprazole; Take 1 capsule (20 mg total) by mouth every morning.  Dispense: 30 capsule; Refill: 0          Return if symptoms worsen or fail to improve.    Adriel Oreilly MD, 1/3/2024, 1:09 PM

## 2024-02-01 DIAGNOSIS — K21.9 GASTROESOPHAGEAL REFLUX DISEASE WITHOUT ESOPHAGITIS: ICD-10-CM

## 2024-02-01 RX ORDER — OMEPRAZOLE 20 MG/1
20 CAPSULE, DELAYED RELEASE ORAL EVERY MORNING
Qty: 30 CAPSULE | Refills: 0 | Status: SHIPPED | OUTPATIENT
Start: 2024-02-01

## 2024-02-01 NOTE — TELEPHONE ENCOUNTER
A refill request was received for:  Requested Prescriptions     Pending Prescriptions Disp Refills    OMEPRAZOLE 20 MG Oral Capsule Delayed Release [Pharmacy Med Name: OMEPRAZOLE DR 20 MG CAPSULE] 30 capsule 0     Sig: TAKE 1 CAPSULE BY MOUTH EVERY DAY IN THE MORNING     Last refill date:  1/3/24  Qty: 30  Dx: GERD  Last office visit: 1/3/24   When is follow up due: Not documented     For hormone prescriptions, date of last blood test for rx being requested:    Future Appointments   Date Time Provider Department Center   7/8/2024  2:00 PM Sharee Bingham APRN [id number]         Omeprazole 20 mg, # 30 pended; authorize if appropriate.

## 2024-07-08 ENCOUNTER — OFFICE VISIT (OUTPATIENT)
Dept: FAMILY MEDICINE CLINIC | Facility: CLINIC | Age: 34
End: 2024-07-08
Payer: COMMERCIAL

## 2024-07-08 VITALS
DIASTOLIC BLOOD PRESSURE: 70 MMHG | SYSTOLIC BLOOD PRESSURE: 112 MMHG | WEIGHT: 132 LBS | HEART RATE: 71 BPM | TEMPERATURE: 97 F | OXYGEN SATURATION: 98 % | BODY MASS INDEX: 21.21 KG/M2 | HEIGHT: 66 IN

## 2024-07-08 DIAGNOSIS — M65.4 RADIAL STYLOID TENOSYNOVITIS (DE QUERVAIN): ICD-10-CM

## 2024-07-08 DIAGNOSIS — Q25.1 COARCTATION OF AORTA (HCC): ICD-10-CM

## 2024-07-08 DIAGNOSIS — Z00.00 ADULT GENERAL MEDICAL EXAMINATION: Primary | ICD-10-CM

## 2024-07-08 PROBLEM — H57.13 EYE PAIN, BILATERAL: Status: RESOLVED | Noted: 2023-03-20 | Resolved: 2024-07-08

## 2024-07-08 PROBLEM — H57.13 EYE PAIN, BILATERAL: Status: ACTIVE | Noted: 2023-03-20

## 2024-07-08 PROBLEM — H57.9 ITCHY EYES: Status: ACTIVE | Noted: 2023-03-20

## 2024-07-08 PROBLEM — Q23.81 BICUSPID AORTIC VALVE: Status: ACTIVE | Noted: 2023-11-16

## 2024-07-08 PROBLEM — Q23.1 BICUSPID AORTIC VALVE (HCC): Status: ACTIVE | Noted: 2023-11-16

## 2024-07-08 PROBLEM — H04.209 EPIPHORA: Status: ACTIVE | Noted: 2023-03-20

## 2024-07-08 NOTE — PROGRESS NOTES
Chief Complaint:   Chief Complaint   Patient presents with    Physical       HPI:   This is a 33 year old female coming in for physical. Feels generally well. 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 Admits diet and exercise have not been great but she is improving that now. Hx of R leigh Chema Carolina's in November 2023, this has improved but still bothers her at times. Has young kids. Pain now is intermittent. Has a brace at home but has not been using it.     Results for orders placed or performed in visit on 07/03/23   CBC With Differential With Platelet   Result Value Ref Range    WHITE BLOOD CELL COUNT 9.2 3.8 - 10.8 Thousand/uL    RED BLOOD CELL COUNT 4.53 3.80 - 5.10 Million/uL    HEMOGLOBIN 13.9 11.7 - 15.5 g/dL    HEMATOCRIT 39.8 35.0 - 45.0 %    MCV 87.9 80.0 - 100.0 fL    MCH 30.7 27.0 - 33.0 pg    MCHC 34.9 32.0 - 36.0 g/dL    RDW 12.1 11.0 - 15.0 %    PLATELET COUNT 267 140 - 400 Thousand/uL    MPV 10.7 7.5 - 12.5 fL    ABSOLUTE NEUTROPHILS 5,667 1,500 - 7,800 cells/uL    ABSOLUTE LYMPHOCYTES 2,916 850 - 3,900 cells/uL    ABSOLUTE MONOCYTES 552 200 - 950 cells/uL    ABSOLUTE EOSINOPHILS 37 15 - 500 cells/uL    ABSOLUTE BASOPHILS 28 0 - 200 cells/uL    NEUTROPHILS 61.6 %    LYMPHOCYTES 31.7 %    MONOCYTES 6.0 %    EOSINOPHILS 0.4 %    BASOPHILS 0.3 %   Comp Metabolic Panel (14)   Result Value Ref Range    GLUCOSE 75 65 - 99 mg/dL    UREA NITROGEN (BUN) 12 7 - 25 mg/dL    CREATININE 0.89 0.50 - 0.97 mg/dL    EGFR 89 > OR = 60 mL/min/1.73m2    BUN/CREATININE RATIO SEE NOTE: 6 - 22 (calc)    SODIUM 139 135 - 146 mmol/L    POTASSIUM 4.0 3.5 - 5.3 mmol/L    CHLORIDE 103 98 - 110 mmol/L    CARBON DIOXIDE 27 20 - 32 mmol/L    CALCIUM 9.9 8.6 - 10.2 mg/dL    PROTEIN, TOTAL 7.5 6.1 - 8.1 g/dL    ALBUMIN 4.9 3.6 - 5.1 g/dL    GLOBULIN 2.6 1.9 - 3.7 g/dL (calc)    ALBUMIN/GLOBULIN RATIO 1.9 1.0 - 2.5 (calc)    BILIRUBIN, TOTAL 1.6 (H) 0.2 - 1.2 mg/dL    ALKALINE PHOSPHATASE 37 31 - 125 U/L     AST 40 (H) 10 - 30 U/L    ALT 29 6 - 29 U/L   Hemoglobin A1C   Result Value Ref Range    HEMOGLOBIN A1c 4.8 <5.7 % of total Hgb   Lipid Panel   Result Value Ref Range    CHOLESTEROL, TOTAL 167 <200 mg/dL    HDL CHOLESTEROL 69 > OR = 50 mg/dL    TRIGLYCERIDES 60 <150 mg/dL    LDL-CHOLESTEROL 84 mg/dL (calc)    CHOL/HDLC RATIO 2.4 <5.0 (calc)    NON-HDL CHOLESTEROL 98 <130 mg/dL (calc)   TSH W Reflex To Free T4 [E]   Result Value Ref Range    TSH W/REFLEX TO FT4 1.72 mIU/L             History reviewed. No pertinent past medical history.  Past Surgical History:   Procedure Laterality Date    Other      CONGENITAL HEART DEFECT- Coarctation of the aorta AGE 12    Other      2  C SECTIONS 2016, 2020     Social History:  Social History     Socioeconomic History    Marital status:    Tobacco Use    Smoking status: Never    Smokeless tobacco: Never   Vaping Use    Vaping status: Never Used   Substance and Sexual Activity    Alcohol use: Yes     Alcohol/week: 1.0 standard drink of alcohol     Types: 1 Glasses of wine per week    Drug use: Never   Other Topics Concern    Caffeine Concern No    Exercise No    Special Diet No    Stress Concern No    Weight Concern No     Social Determinants of Health     Physical Activity: Sufficiently Active (9/25/2019)    [place] from [facility]    Exercise Vital Sign     Days of Exercise per Week: 3 days     Minutes of Exercise per Session: 60 min     Family History:  Family History   Problem Relation Age of Onset    Other (GLAUCOMA, BLOOD CLOT CAUSED BY INJURY) Mother     Heart Attack Father     Other (ASPERGER) Brother     Breast Cancer Maternal Grandmother     Other (ALZHEIMER) Maternal Grandfather     No Known Problems Paternal Grandmother     Other (EMPHYSEMA-SMOKER) Paternal Grandfather      Allergies:  No Active Allergies  Current Meds:  Current Outpatient Medications   Medication Sig Dispense Refill    spironolactone 50 MG Oral Tab Take 3  tablets (150 mg total) by mouth daily. Taking 150 mg 1 time a day.      Trifarotene (AKLIEF) 0.005 % External Cream       Dapsone 5 % External Gel Apply topically 2 (two) times daily.      Multiple Vitamin (MULTIVITAMIN ADULT OR) Take by mouth.      Etonogestrel (NEXPLANON) 68 MG Subcutaneous Implant Nexplanon 68 mg subdermal implant        Counseling given: Not Answered       REVIEW OF SYSTEMS:   CONSTITUTIONAL:  Denies unusual weight gain/loss, fever, chills, or fatigue.  HEENT:  Eyes:  Denies eye pain, visual loss, blurred vision. Denies hearing loss, sneezing, congestion, runny nose or sore throat.  INTEGUMENTARY:  Denies rashes, itching, skin lesion.  CARDIOVASCULAR:  Denies chest pain, palpitations, edema, dyspnea on exertion or at rest.  RESPIRATORY:  Denies shortness of breath, wheezing, cough or sputum.  GASTROINTESTINAL:  Denies abdominal pain, nausea, vomiting, constipation, diarrhea, or blood in stool.  GENITOURINARY: Denies dysuria, hematuria, frequency.  MUSCULOSKELETAL:  See HPI.  NEUROLOGICAL:  Denies headache, seizures, dizziness.  PSYCHIATRIC:  Denies depression or anxiety. Denies suicidal thoughts.    EXAM:   /70 (BP Location: Right arm, Patient Position: Sitting, Cuff Size: adult)   Pulse 71   Temp 97.1 °F (36.2 °C) (Temporal)   Ht 5' 6\" (1.676 m)   Wt 132 lb (59.9 kg)   LMP 06/21/2024   SpO2 98%   BMI 21.31 kg/m²  Estimated body mass index is 21.31 kg/m² as calculated from the following:    Height as of this encounter: 5' 6\" (1.676 m).    Weight as of this encounter: 132 lb (59.9 kg).   Vital signs reviewed.Appears stated age, well groomed, in no acute distress.  Physical Exam:  GEN:  Patient is alert, awake and oriented, well developed, well nourished.  HEENT:  Head:  Normocephalic, atraumatic Eyes: EOMI, PERRLA, conjunctivae clear bilaterally, no eye discharge Ears: External normal, TM clear bilaterally. Nose: patent, no nasal discharge. Throat:  No tonsillar erythema or exudate.   Mouth:  No oral lesions, good dentition.  NECK: Supple, no CLAD, no thyromegaly.  SKIN: No rashes, no skin lesion, no bruising, good turgor.  HEART:  Regular rate and rhythm, no murmurs, rubs or gallops.  LUNGS: Clear to auscultation bilterally, no rales/rhonchi/wheezing.  ABDOMEN:  Soft, nondistended, nontender, bowel sounds normal in all 4 quadrants, no masses, no hepatosplenomegaly.  BACK: No tenderness to palpation, FROM.  EXTREMITIES:  No edema, no cyanosis, no clubbing, FROM, 2+ dorsalis pedis pulses bilaterally. Positive Finkelstein's R wrist.  NEURO:  No deficit, normal gait, strength and tone, sensory intact, normal reflexes.    ASSESSMENT AND PLAN:   1. Adult general medical examination  -Healthy diet and regular exercise.  -Annual eye exam and biannual dental visits.  -Labs as below.    2. Coarctation of aorta (HCC)  -Seeing cardiology next week.     3. Radial styloid tenosynovitis (de quervain)  -Recommended RICE when this flares. May wear brace PRN for flares in symptoms. Should avoid offending activities. May need PT/OT if symptoms increase/persist.       Meds & Refills for this Visit:  Requested Prescriptions      No prescriptions requested or ordered in this encounter         Problem List:  Patient Active Problem List   Diagnosis    Coarctation of aorta (HCC)    Melanoma in situ of left lower extremity including hip (HCC)    Bicuspid aortic valve (HCC)    Epiphora    Itchy eyes       Sharee Bingham, APRN  7/8/2024  2:30 PM

## 2024-07-09 LAB
ALBUMIN/GLOBULIN RATIO: 1.6 (CALC) (ref 1–2.5)
ALBUMIN: 4.6 G/DL (ref 3.6–5.1)
ALKALINE PHOSPHATASE: 38 U/L (ref 31–125)
ALT: 12 U/L (ref 6–29)
AST: 18 U/L (ref 10–30)
BILIRUBIN, DIRECT: 0.3 MG/DL
BILIRUBIN, INDIRECT: 1.3 MG/DL (CALC) (ref 0.2–1.2)
BILIRUBIN, TOTAL: 1.6 MG/DL (ref 0.2–1.2)
GLOBULIN: 2.8 G/DL (CALC) (ref 1.9–3.7)
PROTEIN, TOTAL: 7.4 G/DL (ref 6.1–8.1)

## 2024-07-10 DIAGNOSIS — R17 ELEVATED BILIRUBIN: Primary | ICD-10-CM

## 2024-11-03 ENCOUNTER — HOSPITAL ENCOUNTER (OUTPATIENT)
Dept: ULTRASOUND IMAGING | Facility: HOSPITAL | Age: 34
Discharge: HOME | End: 2024-11-03
Attending: NURSE PRACTITIONER
Payer: COMMERCIAL

## 2024-11-03 ENCOUNTER — HOSPITAL ENCOUNTER (OUTPATIENT)
Dept: ULTRASOUND IMAGING | Facility: HOSPITAL | Age: 34
End: 2024-11-03
Attending: NURSE PRACTITIONER
Payer: COMMERCIAL

## 2024-11-03 DIAGNOSIS — R17 ELEVATED BILIRUBIN: ICD-10-CM

## 2024-11-03 PROCEDURE — 76705 ECHO EXAM OF ABDOMEN: CPT | Performed by: NURSE PRACTITIONER

## 2024-11-14 ENCOUNTER — NURSE TRIAGE (OUTPATIENT)
Dept: FAMILY MEDICINE CLINIC | Facility: CLINIC | Age: 34
End: 2024-11-14

## 2024-11-14 NOTE — TELEPHONE ENCOUNTER
Patient called office, states she has questions regarding US Liver results.    RN left message for patient to call office back.

## 2024-11-18 NOTE — TELEPHONE ENCOUNTER
[facility] Contact  [facility]/Voicemail     Clinical Data: Care Coordination ED-sourced Outreach-     Outreach attempted x 2.  Left message on patient's voicemail, providing [facility]'s 24/7 scheduling and nurse triage phone number [phone] ([phone]) for questions/concerns and/or to schedule an appt with an [facility] provider.      [facility] unable to send Care Coordination introduction letter as patient does not have an active MyChart account. Clinic Care Coordination services remain available via referral if needed.    Plan: [facility] will do no further outreaches at this time.       DAI Lara  [facility] [place], [facility]    *Connected Care Resource Team does NOT follow patient ongoing. Referrals are identified based on internal discharge reports and the outreach is to ensure patient has an understanding of their discharge instructions.   RN left message for patient to call back.

## 2024-11-22 ENCOUNTER — OFFICE VISIT (OUTPATIENT)
Dept: FAMILY MEDICINE CLINIC | Facility: CLINIC | Age: 34
End: 2024-11-22
Payer: COMMERCIAL

## 2024-11-22 VITALS
DIASTOLIC BLOOD PRESSURE: 64 MMHG | HEART RATE: 74 BPM | TEMPERATURE: 99 F | SYSTOLIC BLOOD PRESSURE: 102 MMHG | WEIGHT: 133 LBS | OXYGEN SATURATION: 97 % | BODY MASS INDEX: 21 KG/M2

## 2024-11-22 DIAGNOSIS — Z13.9 ENCOUNTER FOR SCREENING: ICD-10-CM

## 2024-11-22 DIAGNOSIS — K76.0 FATTY LIVER: ICD-10-CM

## 2024-11-22 DIAGNOSIS — R10.13 EPIGASTRIC DISCOMFORT: Primary | ICD-10-CM

## 2024-11-22 PROCEDURE — 3078F DIAST BP <80 MM HG: CPT | Performed by: STUDENT IN AN ORGANIZED HEALTH CARE EDUCATION/TRAINING PROGRAM

## 2024-11-22 PROCEDURE — 99214 OFFICE O/P EST MOD 30 MIN: CPT | Performed by: STUDENT IN AN ORGANIZED HEALTH CARE EDUCATION/TRAINING PROGRAM

## 2024-11-22 PROCEDURE — 3074F SYST BP LT 130 MM HG: CPT | Performed by: STUDENT IN AN ORGANIZED HEALTH CARE EDUCATION/TRAINING PROGRAM

## 2024-11-22 NOTE — PROGRESS NOTES
Subjective:   Sumi Guzman is a 34 year old female who presents for Follow - Up (Bilirubin, fatty liver results from US), Abdominal Pain (Middle quadrant for years about 2 years, tried gavascon OTC no help), and Cold (Feels her hands and fingers hurt from being cold, for 3 years)     34-year-old female coming in to discuss liver ultrasound results and epigastric discomfort.  Mentions that epigastric discomfort has been happening for quite some time.  She was previously prescribed PPI however she did not fill it or try it.  Describes sensation as acid reflux sensation sometimes traveling up the esophagus.  Never had H. pylori testing.  Historically used to drink 1 alcoholic beverage per week however stopped over the past month.  She does drink coffee in the morning.  Mentions having more stress and life changes over the past year.  Has not been exercising as much as she usually does.  At this time only exercising around 2 days a week.  Continues to try to eat healthy however has been eating out at times over the past year.    History/Other:    Chief Complaint Reviewed and Verified  Nursing Notes Reviewed and   Verified  Tobacco Reviewed  Allergies Reviewed  Medications Reviewed    OB Status Reviewed         Tobacco:  She has never smoked tobacco.    Current Outpatient Medications   Medication Sig Dispense Refill    Trifarotene (AKLIEF) 0.005 % External Cream       Dapsone 5 % External Gel Apply topically 2 (two) times daily.      Multiple Vitamin (MULTIVITAMIN ADULT OR) Take by mouth.      Etonogestrel (NEXPLANON) 68 MG Subcutaneous Implant Nexplanon 68 mg subdermal implant           Review of Systems:  Review of Systems   Constitutional:  Negative for chills, diaphoresis and fever.   HENT:  Negative for congestion, ear discharge, ear pain, sinus pressure, sinus pain and sore throat.    Eyes:  Negative for pain and discharge.   Respiratory:  Negative for cough, chest tightness, shortness of breath and  wheezing.    Cardiovascular:  Negative for chest pain and palpitations.   Gastrointestinal:  Negative for abdominal pain, diarrhea, nausea and vomiting.        Occasional epigastric discomfort.   Endocrine: Negative for cold intolerance and heat intolerance.   Genitourinary:  Negative for dysuria, flank pain, frequency and urgency.   Musculoskeletal:  Negative for joint swelling.   Skin:  Negative for rash.   Neurological:  Negative for dizziness, syncope and headaches.   Psychiatric/Behavioral:  Negative for confusion and hallucinations.        Objective:   /64 (BP Location: Right arm, Patient Position: Sitting, Cuff Size: adult)   Pulse 74   Temp 98.5 °F (36.9 °C) (Oral)   Wt 133 lb (60.3 kg)   LMP 10/24/2024 (Approximate)   SpO2 97%   BMI 21.47 kg/m²  Estimated body mass index is 21.47 kg/m² as calculated from the following:    Height as of 7/8/24: 5' 6\" (1.676 m).    Weight as of this encounter: 133 lb (60.3 kg).  Physical Exam  Constitutional:       General: She is not in acute distress.     Appearance: Normal appearance. She is not ill-appearing or toxic-appearing.   HENT:      Head: Normocephalic and atraumatic.   Cardiovascular:      Rate and Rhythm: Normal rate and regular rhythm.      Heart sounds: Normal heart sounds. No murmur heard.     No gallop.   Pulmonary:      Effort: Pulmonary effort is normal. No respiratory distress.      Breath sounds: Normal breath sounds. No stridor. No wheezing, rhonchi or rales.   Abdominal:      General: Bowel sounds are normal.      Palpations: Abdomen is soft.      Tenderness: There is no right CVA tenderness, left CVA tenderness or guarding.      Comments: Discomfort to palpation over epigastric area.   Musculoskeletal:         General: No swelling.      Cervical back: Normal range of motion and neck supple. No rigidity or tenderness.   Skin:     General: Skin is warm and dry.   Neurological:      General: No focal deficit present.      Mental Status: She is  alert and oriented to person, place, and time. Mental status is at baseline.   Psychiatric:         Mood and Affect: Mood normal.         Behavior: Behavior normal.         Thought Content: Thought content normal.         Judgment: Judgment normal.         Assessment & Plan:     US LIVER (CPT=76705)    Result Date: 11/4/2024  CONCLUSION:   1. Increased echogenicity and heterogeneity of the liver compatible with fatty infiltration/intrinsic liver does  2. Normal sonographic appearance of the gallbladder and right kidney.     Dictated by (CST): Linden Lamb MD on 11/04/2024 at 7:54 AM     Finalized by (CST): Linden Lamb MD on 11/04/2024 at 7:57 AM           1. Epigastric discomfort (Primary)  -Dietary modifications  -Discussed trial of PPI or H2 blocker  -     H. Pylori Stool Ag, EIA  2. Fatty liver  Reviewed liver ultrasound.  NAFLD (Non-Alcoholic Fatty Liver Disease) Fibrosis Score: -3.49 points.  -Low likelihood of concern. Discussed elastography for reassurance  -Healthy diet and lifestyle.  -     US LIVER WITH ELASTOGRAPHY(CPT=76705,85205); Future; Expected date: 11/22/2024  3. Encounter for screening  -     Ferritin  -     Iron And Tibc        Return for Routine care.    Adriel Oreilly MD, 11/22/2024, 3:03 PM           Time spent: 30 minutes, obtaining history, evaluating patient, discussing differential diagnosis, recommendations, diagnostic testing options, treatment options, risks and benefits of my recommendations, counseling patient, discussing prognosis/expectations, and follow up with patient as well as completing documentation.

## 2024-11-22 NOTE — TELEPHONE ENCOUNTER
RN spoke with patient.     Patient had liver ultrasound and was told she has a fatty liver. Patient was recommended to exercise daily for management.     Patient would like to know \"severity of the fatty liver\".?     Patient wants to know the risk of developing \"something more serious\" and if this is   something that is \"reversible with changes\"?    Patient feels she has been very healthy and exercising daily over the last year or two.   Patient is curious to know why she has fatty liver.    Patient is having wanting to know if she is having side effects from fatty liver.  Patient has been having upper abdominal pain for the last two years intermittently.     Over last few months has been daily. Patient feels like it may be acid reflux today.    Rates discomfort 2-3/10 intermittently.     Patient is  not having upper abdominal pain now,.    Patient is not sure if her eyes are jaundiced or not? Says her eyes are not bright white but not sure if they are yellow.    Patient denies having any vomiting, chest pain, blood in stool, fevers, current abdominal pain, or other symptoms at this time.     Patient is on nexplanon- unsure if chance of pregnancy.    LMP: 10/24/24    Patient scheduled to see Dr. Oreilly today for an assessment and evaluation of her symptoms/discuss liver ultrasound questions.               Reason for Disposition   MODERATE pain (e.g., interferes with normal activities that comes and goes (cramps) lasts > 24 hours  (Exception: Pain with Vomiting or Diarrhea - see that Protocol.)    Protocols used: Abdominal Pain - Female-A-[place]

## 2025-06-02 ENCOUNTER — TELEPHONE (OUTPATIENT)
Dept: FAMILY MEDICINE CLINIC | Facility: CLINIC | Age: 35
End: 2025-06-02

## 2025-06-02 NOTE — TELEPHONE ENCOUNTER
Pt left a message stating 2 weeks ago pt went to [facility] for ear pain. Pt was dx with swimmers ear and was prescribed ear drops. Pt stated she had side effects from the drops, ringing in th ears, fullness, headaches and dizzy. Pt stated the drops helped with the pain but stopped taking them after 3 1/2 days because of the side effects. Pt stated the ear pain came back on Saturday and she took the drops and felt the side effects again. Pt looking for call back for advice.    RN called and left pt a message to call back and encouraged pt to schedule appointment for evaluation..

## 2025-06-10 ENCOUNTER — OFFICE VISIT (OUTPATIENT)
Dept: FAMILY MEDICINE CLINIC | Facility: CLINIC | Age: 35
End: 2025-06-10
Payer: COMMERCIAL

## 2025-06-10 VITALS
BODY MASS INDEX: 21.96 KG/M2 | DIASTOLIC BLOOD PRESSURE: 70 MMHG | SYSTOLIC BLOOD PRESSURE: 94 MMHG | HEART RATE: 85 BPM | HEIGHT: 66 IN | RESPIRATION RATE: 16 BRPM | WEIGHT: 136.63 LBS | TEMPERATURE: 98 F | OXYGEN SATURATION: 98 %

## 2025-06-10 DIAGNOSIS — H69.93 DYSFUNCTION OF BOTH EUSTACHIAN TUBES: ICD-10-CM

## 2025-06-10 DIAGNOSIS — H92.03 DISCOMFORT OF BOTH EARS: Primary | ICD-10-CM

## 2025-06-10 PROCEDURE — 3078F DIAST BP <80 MM HG: CPT | Performed by: STUDENT IN AN ORGANIZED HEALTH CARE EDUCATION/TRAINING PROGRAM

## 2025-06-10 PROCEDURE — 99213 OFFICE O/P EST LOW 20 MIN: CPT | Performed by: STUDENT IN AN ORGANIZED HEALTH CARE EDUCATION/TRAINING PROGRAM

## 2025-06-10 PROCEDURE — 3008F BODY MASS INDEX DOCD: CPT | Performed by: STUDENT IN AN ORGANIZED HEALTH CARE EDUCATION/TRAINING PROGRAM

## 2025-06-10 PROCEDURE — 3074F SYST BP LT 130 MM HG: CPT | Performed by: STUDENT IN AN ORGANIZED HEALTH CARE EDUCATION/TRAINING PROGRAM

## 2025-06-10 RX ORDER — FLUTICASONE PROPIONATE 50 MCG
2 SPRAY, SUSPENSION (ML) NASAL DAILY
Qty: 1 EACH | Refills: 1 | Status: SHIPPED | OUTPATIENT
Start: 2025-06-10

## 2025-06-10 RX ORDER — CIPROFLOXACIN AND DEXAMETHASONE 3; 1 MG/ML; MG/ML
4 SUSPENSION/ DROPS AURICULAR (OTIC) 2 TIMES DAILY
COMMUNITY
Start: 2025-06-07 | End: 2025-06-14

## 2025-06-10 NOTE — PROGRESS NOTES
Subjective:   Sumi Guzman is a 34 year old female who presents for Ear Pain (Bilateral ear pain x3 days)     34-year-old female coming in to follow-up after immediate care visit on 6/7/2025.  She was seen due to sensation of fullness and muffled hearing from bilateral ears.  She was diagnosed with otitis externa and prescribed ciprofloxacin-dexamethasone otic suspension.  Given that TMs were not fully visualized, she was also prescribed Augmentin to use if no improvement with otic suspension.  Patient had similar symptoms on 5/19/2025, diagnosed with otitis externa of both ears and prescribed neomycin-polymyxin-hydrocortisone however she did not tolerate those eardrops well.  Now patient taking Motrin for her ear discomfort and doing warm compress.  Her symptoms improved from her immediate care visit however not completely resolved.  She is no longer having headaches.  Denies fever, chills, congestion, recent swimming, history of grinding her teeth, TMJ pain.    History/Other:    Chief Complaint Reviewed and Verified  Nursing Notes Reviewed and   Verified  Tobacco Reviewed  Allergies Reviewed  Medications Reviewed    Medical History Reviewed  Surgical History Reviewed  OB Status Reviewed    Family History Reviewed  Social History Reviewed         Tobacco:  She has never smoked tobacco.    Current Medications[1]      Review of Systems:  Review of Systems   Constitutional:  Negative for chills, diaphoresis and fever.   HENT:  Negative for congestion, ear discharge, sinus pressure, sinus pain and sore throat.         Bilateral ear discomfort   Eyes:  Negative for pain and discharge.   Respiratory:  Negative for cough, chest tightness, shortness of breath and wheezing.    Cardiovascular:  Negative for chest pain and palpitations.   Gastrointestinal:  Negative for abdominal pain, diarrhea, nausea and vomiting.   Endocrine: Negative for cold intolerance and heat intolerance.   Genitourinary:  Negative  for dysuria, flank pain, frequency and urgency.   Musculoskeletal:  Negative for joint swelling.   Skin:  Negative for rash.   Neurological:  Negative for dizziness, syncope and headaches.   Psychiatric/Behavioral:  Negative for confusion and hallucinations.        Objective:   BP 94/70 (BP Location: Right arm, Patient Position: Sitting, Cuff Size: adult)   Pulse 85   Temp 98.4 °F (36.9 °C) (Temporal)   Resp 16   Ht 5' 6\" (1.676 m)   Wt 136 lb 9.6 oz (62 kg)   LMP 05/30/2025 (Exact Date)   SpO2 98%   BMI 22.05 kg/m²  Estimated body mass index is 22.05 kg/m² as calculated from the following:    Height as of this encounter: 5' 6\" (1.676 m).    Weight as of this encounter: 136 lb 9.6 oz (62 kg).  Physical Exam  Constitutional:       General: She is not in acute distress.     Appearance: Normal appearance. She is not ill-appearing or toxic-appearing.   HENT:      Head: Normocephalic and atraumatic.      Comments: No TTP over bilateral TMJ.     Right Ear: Tympanic membrane and ear canal normal.      Left Ear: Tympanic membrane and ear canal normal.      Ears:      Comments: Bilaterally no tenderness when pulling on helix, lobule or pushing against tragus.  Cardiovascular:      Rate and Rhythm: Normal rate and regular rhythm.      Heart sounds: Normal heart sounds. No murmur heard.     No gallop.   Pulmonary:      Effort: Pulmonary effort is normal. No respiratory distress.      Breath sounds: Normal breath sounds. No stridor. No wheezing, rhonchi or rales.   Musculoskeletal:      Cervical back: Normal range of motion and neck supple.   Skin:     General: Skin is warm and dry.   Neurological:      General: No focal deficit present.      Mental Status: She is alert and oriented to person, place, and time. Mental status is at baseline.   Psychiatric:         Mood and Affect: Mood normal.         Behavior: Behavior normal.         Thought Content: Thought content normal.         Judgment: Judgment normal.          Assessment & Plan:   1. Discomfort of both ears (Primary)  Was seen in immediate care due to sensation of fullness and muffled hearing from bilateral ears. Diagnosed with otitis externa and prescribed ciprofloxacin-dexamethasone otic suspension.   -Continue with otic suspensions.  -Over-the-counter analgesics such as nonsteroidal antiinflammatory drugs (NSAIDs) and acetaminophen can be used for pain relief as needed  2. Dysfunction of both eustachian tubes  -Mechanical irrigation with saline may reduce the need for pain medication and improve overall comfort. Intranasal saline spray may also be used  -Short-term use of intranasal glucocorticoids  -Short course (three to five days) of oral decongestants may be useful.  -Antihistamines are frequently used for symptom relief due to their drying effects  -     Fluticasone Propionate; 2 sprays by Each Nare route daily.  Dispense: 1 each; Refill: 1          Return if symptoms worsen or fail to improve.    Adriel Oreilly MD, 6/10/2025, 9:36 AM          [1]   Current Outpatient Medications   Medication Sig Dispense Refill    ciprofloxacin-dexamethasone 0.3-0.1 % Otic Suspension Place 4 drops in ear(s) 2 (two) times daily.      fluticasone propionate 50 MCG/ACT Nasal Suspension 2 sprays by Each Nare route daily. 1 each 1    Multiple Vitamin (MULTIVITAMIN ADULT OR) Take by mouth.      Etonogestrel (NEXPLANON) 68 MG Subcutaneous Implant Nexplanon 68 mg subdermal implant      amoxicillin clavulanate 875-125 MG Oral Tab Take 1 tablet by mouth 2 (two) times daily. (Patient not taking: Reported on 6/10/2025)

## 2025-07-14 ENCOUNTER — PATIENT MESSAGE (OUTPATIENT)
Dept: FAMILY MEDICINE CLINIC | Facility: CLINIC | Age: 35
End: 2025-07-14

## 2025-07-14 ENCOUNTER — OFFICE VISIT (OUTPATIENT)
Dept: FAMILY MEDICINE CLINIC | Facility: CLINIC | Age: 35
End: 2025-07-14
Payer: COMMERCIAL

## 2025-07-14 VITALS
OXYGEN SATURATION: 99 % | BODY MASS INDEX: 21.92 KG/M2 | HEIGHT: 66 IN | HEART RATE: 61 BPM | SYSTOLIC BLOOD PRESSURE: 112 MMHG | TEMPERATURE: 98 F | DIASTOLIC BLOOD PRESSURE: 62 MMHG | WEIGHT: 136.38 LBS

## 2025-07-14 DIAGNOSIS — Z30.09 ENCOUNTER FOR OTHER GENERAL COUNSELING OR ADVICE ON CONTRACEPTION: Primary | ICD-10-CM

## 2025-07-14 DIAGNOSIS — J30.2 SEASONAL ALLERGIES: ICD-10-CM

## 2025-07-14 DIAGNOSIS — Z00.00 ADULT GENERAL MEDICAL EXAMINATION: Primary | ICD-10-CM

## 2025-07-14 DIAGNOSIS — Q25.1 COARCTATION OF AORTA (HCC): ICD-10-CM

## 2025-07-14 DIAGNOSIS — G43.109 MIGRAINE WITH AURA AND WITHOUT STATUS MIGRAINOSUS, NOT INTRACTABLE: ICD-10-CM

## 2025-07-14 RX ORDER — LORATADINE 10 MG/1
CAPSULE, LIQUID FILLED ORAL
COMMUNITY
Start: 2025-06-16

## 2025-07-14 RX ORDER — AMPICILLIN TRIHYDRATE 250 MG
CAPSULE ORAL
COMMUNITY
Start: 2025-06-01

## 2025-07-14 NOTE — PROGRESS NOTES
Chief Complaint:   Chief Complaint   Patient presents with    Physical       HPI:   This is a 34 year old female coming in for physical. Feels well overall. Hx coarctation of aorta, followed by cardiology. Hx fatty liver on US, additional testing ordered and will monitor LFTs. Has been exercising more and trying to eat a healthy diet. On Claritin and Flonase and feels much better with her eustachian tube dysfunction.    Results for orders placed or performed in visit on 11/21/23   Hepatic Function Panel (7) [E]    Collection Time: 07/08/24  7:49 AM   Result Value Ref Range    PROTEIN, TOTAL 7.4 6.1 - 8.1 g/dL    ALBUMIN 4.6 3.6 - 5.1 g/dL    GLOBULIN 2.8 1.9 - 3.7 g/dL (calc)    ALBUMIN/GLOBULIN RATIO 1.6 1.0 - 2.5 (calc)    BILIRUBIN, TOTAL 1.6 (H) 0.2 - 1.2 mg/dL    BILIRUBIN, DIRECT 0.3 (H) < OR = 0.2 mg/dL    BILIRUBIN, INDIRECT 1.3 (H) 0.2 - 1.2 mg/dL (calc)    ALKALINE PHOSPHATASE 38 31 - 125 U/L    AST 18 10 - 30 U/L    ALT 12 6 - 29 U/L             Past Medical History[1]  Past Surgical History[2]  Social History:  Short Social Hx on File[3]  Family History:  Family History[4]  Allergies:  Allergies[5]  Current Meds:  Current Medications[6]   Counseling given: Not Answered       REVIEW OF SYSTEMS:   CONSTITUTIONAL:  Denies unusual weight gain/loss, fever, chills, or fatigue.  HEENT:  Eyes:  Denies eye pain, visual loss, blurred vision. Denies hearing loss, sneezing, congestion, runny nose or sore throat.  INTEGUMENTARY:  Denies rashes, itching, skin lesion.  CARDIOVASCULAR:  Denies chest pain, palpitations, edema, dyspnea on exertion or at rest.  RESPIRATORY:  Denies shortness of breath, wheezing, cough or sputum.  GASTROINTESTINAL:  Denies abdominal pain, nausea, vomiting, constipation, diarrhea, or blood in stool.  GENITOURINARY: Denies dysuria, hematuria, frequency.  MUSCULOSKELETAL:  Denies weakness, muscle aches, back pain, joint pain, swelling or stiffness.  NEUROLOGICAL:  Denies seizures, dizziness.  Migraine with aura-gets about 4-6/year. Plans to get Nexplanon out and Mirena soon.  PSYCHIATRIC:  Denies depression or anxiety. Denies suicidal thoughts.    EXAM:   /62 (BP Location: Right arm, Patient Position: Sitting, Cuff Size: adult)   Pulse 61   Temp 97.7 °F (36.5 °C)   Ht 5' 6\" (1.676 m)   Wt 136 lb 6.4 oz (61.9 kg)   LMP 06/03/2025 (Exact Date)   SpO2 99%   BMI 22.02 kg/m²  Estimated body mass index is 22.02 kg/m² as calculated from the following:    Height as of this encounter: 5' 6\" (1.676 m).    Weight as of this encounter: 136 lb 6.4 oz (61.9 kg).   Vital signs reviewed.Appears stated age, well groomed, in no acute distress.  Physical Exam:  GEN:  Patient is alert, awake and oriented, well developed, well nourished.  HEENT:  Head:  Normocephalic, atraumatic Eyes: EOMI, PERRLA, conjunctivae clear bilaterally, no eye discharge Ears: External normal, TM clear bilaterally. Nose: patent, no nasal discharge. Throat:  No tonsillar erythema or exudate.  Mouth:  No oral lesions, good dentition.  NECK: Supple, no CLAD, no thyromegaly.  SKIN: No rashes, no skin lesion, no bruising, good turgor.  HEART:  Regular rate and rhythm, no murmurs, rubs or gallops.  LUNGS: Clear to auscultation bilterally, no rales/rhonchi/wheezing.  ABDOMEN:  Soft, nondistended, nontender, bowel sounds normal in all 4 quadrants, no masses, no hepatosplenomegaly.  BACK: No tenderness to palpation, FROM.  EXTREMITIES:  No edema, no cyanosis, no clubbing, FROM, 2+ dorsalis pedis pulses bilaterally.  NEURO:  No deficit, normal gait, strength and tone, sensory intact, normal reflexes.    ASSESSMENT AND PLAN:   1. Adult general medical examination  -Healthy diet and regular exercise.  -Annual eye exam and biannual dental visits.  -Follow-up with gyne for birth control change.   - CBC With Differential With Platelet  - Comp Metabolic Panel (14)  - Hemoglobin A1C  - Lipid Panel  - Assay, Thyroid Stim Hormone    2. Coarctation of  aorta (HCC)  -Followed by cardiology.    3. Seasonal allergies  -Continue OTC medications PRN.    4. Migraine with aura and without status migrainosus, not intractable  -Avoid triggers.  -Can see neuro-information for Dr. Shabazz provided.       Meds & Refills for this Visit:  Requested Prescriptions      No prescriptions requested or ordered in this encounter         Problem List:  Problem List[7]    Sharee Bingham, APRN  7/14/2025  2:13 PM         [1] History reviewed. No pertinent past medical history.  [2]   Past Surgical History:  Procedure Laterality Date    Other      CONGENITAL HEART DEFECT- Coarctation of the aorta AGE 12    Other      2  C SECTIONS 2016, 2020   [3]   Social History  Socioeconomic History    Marital status:    Tobacco Use    Smoking status: Never     Passive exposure: Never    Smokeless tobacco: Never   Vaping Use    Vaping status: Never Used   Substance and Sexual Activity    Alcohol use: Yes     Alcohol/week: 1.0 standard drink of alcohol     Types: 1 Glasses of wine per week    Drug use: Never   Other Topics Concern    Caffeine Concern No    Exercise Yes    Seat Belt Yes    Special Diet No    Stress Concern No    Weight Concern No     Social Drivers of Health     Food Insecurity: No Food Insecurity (6/10/2025)    NCSS - Food Insecurity     Worried About Running Out of Food in the Last Year: No     Ran Out of Food in the Last Year: No   Transportation Needs: No Transportation Needs (6/10/2025)    NCSS - Transportation     Lack of Transportation: No   Housing Stability: Not At Risk (6/10/2025)    NCSS - Housing/Utilities     Has Housing: Yes     Worried About Losing Housing: No     Unable to Get Utilities: No   [4]   Family History  Problem Relation Age of Onset    Other (GLAUCOMA, BLOOD CLOT CAUSED BY INJURY) Mother     Heart Attack Father     Other (ASPERGER) Brother     Breast Cancer Maternal Grandmother     Other (ALZHEIMER) Maternal Grandfather     No Known Problems Paternal  Grandmother     Other (EMPHYSEMA-SMOKER) Paternal Grandfather    [5]   Allergies  Allergen Reactions    Seasonal OTHER (SEE COMMENTS)     Headache, ear ache, nasal drip   [6]   Current Outpatient Medications   Medication Sig Dispense Refill    Loratadine 10 MG Oral Cap       Coenzyme Q10 (COQ10) 200 MG Oral Cap       Magnesium 125 MG Oral Cap       NON FORMULARY in the morning. retinal cream.      fluticasone propionate 50 MCG/ACT Nasal Suspension 2 sprays by Each Nare route daily. 1 each 1    Multiple Vitamin (MULTIVITAMIN ADULT OR) Take by mouth.      Etonogestrel (NEXPLANON) 68 MG Subcutaneous Implant Nexplanon 68 mg subdermal implant     [7]   Patient Active Problem List  Diagnosis    Coarctation of aorta (HCC)    Melanoma in situ of left lower extremity including hip (HCC)    Bicuspid aortic valve    Epiphora    Itchy eyes

## 2025-07-21 ENCOUNTER — OFFICE VISIT (OUTPATIENT)
Dept: FAMILY MEDICINE CLINIC | Facility: CLINIC | Age: 35
End: 2025-07-21
Payer: COMMERCIAL

## 2025-07-21 VITALS
DIASTOLIC BLOOD PRESSURE: 60 MMHG | OXYGEN SATURATION: 100 % | BODY MASS INDEX: 21.67 KG/M2 | WEIGHT: 134.81 LBS | TEMPERATURE: 98 F | HEART RATE: 71 BPM | HEIGHT: 66 IN | SYSTOLIC BLOOD PRESSURE: 108 MMHG

## 2025-07-21 DIAGNOSIS — N63.21 MASS OF UPPER OUTER QUADRANT OF LEFT BREAST: ICD-10-CM

## 2025-07-21 DIAGNOSIS — N64.4 BREAST PAIN: Primary | ICD-10-CM

## 2025-07-21 DIAGNOSIS — Z80.3 FAMILY HISTORY OF BREAST CANCER: ICD-10-CM

## 2025-07-21 PROBLEM — I51.9 CARDIAC DISEASE: Status: ACTIVE | Noted: 2024-07-16

## 2025-07-21 PROBLEM — R00.2 PALPITATIONS: Status: ACTIVE | Noted: 2024-07-16

## 2025-07-21 PROCEDURE — 3074F SYST BP LT 130 MM HG: CPT | Performed by: NURSE PRACTITIONER

## 2025-07-21 PROCEDURE — 99213 OFFICE O/P EST LOW 20 MIN: CPT | Performed by: NURSE PRACTITIONER

## 2025-07-21 PROCEDURE — 3078F DIAST BP <80 MM HG: CPT | Performed by: NURSE PRACTITIONER

## 2025-07-21 PROCEDURE — 3008F BODY MASS INDEX DOCD: CPT | Performed by: NURSE PRACTITIONER

## 2025-07-21 RX ORDER — NEOMYCIN SULFATE, POLYMYXIN B SULFATE AND HYDROCORTISONE 10; 3.5; 1 MG/ML; MG/ML; [USP'U]/ML
SUSPENSION/ DROPS AURICULAR (OTIC)
COMMUNITY
Start: 2025-05-19

## 2025-07-21 NOTE — PROGRESS NOTES
Chief Complaint:   Chief Complaint   Patient presents with    Breast Pain     Left- sourness/ lump        HPI:   This is a 34 year old female coming in for pain in both breasts, L>R, and a palpable lump in L UOQ breast that she first noticed about a month ago. Breast pain feels like \"soreness.\" Has a Nexplanon that has been in for just over 3 years, seeing gyne soon and plans to get this removed. No hx breast lump. +Family hx breast cancer in grandmother, inflammatory type. Patient thought breast pain/lump was related to menses but it did not resolve after period. LMP 7/3. No nipple discharge.     Results for orders placed or performed in visit on 11/21/23   Hepatic Function Panel (7) [E]    Collection Time: 07/08/24  7:49 AM   Result Value Ref Range    PROTEIN, TOTAL 7.4 6.1 - 8.1 g/dL    ALBUMIN 4.6 3.6 - 5.1 g/dL    GLOBULIN 2.8 1.9 - 3.7 g/dL (calc)    ALBUMIN/GLOBULIN RATIO 1.6 1.0 - 2.5 (calc)    BILIRUBIN, TOTAL 1.6 (H) 0.2 - 1.2 mg/dL    BILIRUBIN, DIRECT 0.3 (H) < OR = 0.2 mg/dL    BILIRUBIN, INDIRECT 1.3 (H) 0.2 - 1.2 mg/dL (calc)    ALKALINE PHOSPHATASE 38 31 - 125 U/L    AST 18 10 - 30 U/L    ALT 12 6 - 29 U/L             Past Medical History[1]  Past Surgical History[2]  Social History:  Short Social Hx on File[3]  Family History:  Family History[4]  Allergies:  Allergies[5]  Current Meds:  Current Medications[6]   Counseling given: Not Answered       REVIEW OF SYSTEMS:   CONSTITUTIONAL:  Denies unusual weight gain/loss, fever, chills, or fatigue.  CARDIOVASCULAR:  Denies chest pain, palpitations, edema, dyspnea on exertion or at rest.  RESPIRATORY:  Denies shortness of breath, wheezing, cough or sputum.  NEUROLOGICAL:  Denies headache, seizures, dizziness.    EXAM:   /60 (BP Location: Right arm, Patient Position: Sitting, Cuff Size: adult)   Pulse 71   Temp 97.8 °F (36.6 °C) (Temporal)   Ht 5' 6\" (1.676 m)   Wt 134 lb 12.8 oz (61.1 kg)   LMP 07/03/2025 (Exact Date)   SpO2 100%   BMI 21.76  kg/m²  Estimated body mass index is 21.76 kg/m² as calculated from the following:    Height as of this encounter: 5' 6\" (1.676 m).    Weight as of this encounter: 134 lb 12.8 oz (61.1 kg).   Vital signs reviewed.Appears stated age, well groomed, in no acute distress.  Physical Exam:  GEN:  Patient is alert, awake and oriented, well developed, well nourished.  HEART:  Regular rate and rhythm, no murmurs, rubs or gallops.  LUNGS: Clear to auscultation bilterally, no rales/rhonchi/wheezing.  BREASTS: No palpable axillary lymphadenopathy bilaterally. No R breast lump. L breast upper outer quadrant palpable ill-defined tissue, possibly fibrocystic breast tissue. No nipple discharge or retraction BL. L breast slightly more full than R.   NEURO:  No deficit, normal gait, strength and tone, sensory intact.    ASSESSMENT AND PLAN:   1. Breast pain  -Consider hormonal cause. She will see gyne as scheduled to get Nexplanon removed and discuss other contraception options.  -Avoid caffeine, wear non-underwire supportive bras, may use Tylenol/ibuprofen PRN.   - [facility] DARIN 2D+3D DIAGNOSTIC HUGH  BILAT (CPT=77066/16367); Future  - US BREAST BILATERAL LTD (CPT=76642-50); Future  - Surgery Referral - In Network    2. Mass of upper outer quadrant of left breast  -Check US, mammogram, and follow-up with breast surgeon.   - [facility] DARIN 2D+3D DIAGNOSTIC HUGH  BILAT (CPT=77066/75177); Future  - US BREAST BILATERAL LTD (CPT=76642-50); Future  - Surgery Referral - In Network    3. Family history of breast cancer  - [facility] DARIN 2D+3D DIAGNOSTIC HUGH  BILAT (CPT=77066/73677); Future  - US BREAST BILATERAL LTD (CPT=76642-50); Future  - Surgery Referral - In Network      Meds & Refills for this Visit:  Requested Prescriptions      No prescriptions requested or ordered in this encounter         Problem List:  Problem List[7]    Sharee Bingham, APRN  7/21/2025  4:22 PM         [1] History reviewed. No pertinent past medical history.  [2]   Past Surgical  History:  Procedure Laterality Date    Other      CONGENITAL HEART DEFECT- Coarctation of the aorta AGE 12    Other      2  C SECTIONS 2016, 2020   [3]   Social History  Socioeconomic History    Marital status:    Tobacco Use    Smoking status: Never     Passive exposure: Never    Smokeless tobacco: Never   Vaping Use    Vaping status: Never Used   Substance and Sexual Activity    Alcohol use: Yes     Alcohol/week: 1.0 standard drink of alcohol     Types: 1 Glasses of wine per week    Drug use: Never   Other Topics Concern    Caffeine Concern No    Exercise Yes    Seat Belt Yes    Special Diet No    Stress Concern No    Weight Concern No     Social Drivers of Health     Food Insecurity: No Food Insecurity (6/10/2025)    NCSS - Food Insecurity     Worried About Running Out of Food in the Last Year: No     Ran Out of Food in the Last Year: No   Transportation Needs: No Transportation Needs (6/10/2025)    NCSS - Transportation     Lack of Transportation: No   Housing Stability: Not At Risk (6/10/2025)    NCSS - Housing/Utilities     Has Housing: Yes     Worried About Losing Housing: No     Unable to Get Utilities: No   [4]   Family History  Problem Relation Age of Onset    Other (GLAUCOMA, BLOOD CLOT CAUSED BY INJURY) Mother     Heart Attack Father     Other (ASPERGER) Brother     Breast Cancer Maternal Grandmother     Other (ALZHEIMER) Maternal Grandfather     No Known Problems Paternal Grandmother     Other (EMPHYSEMA-SMOKER) Paternal Grandfather    [5]   Allergies  Allergen Reactions    Seasonal OTHER (SEE COMMENTS)     Headache, ear ache, nasal drip   [6]   Current Outpatient Medications   Medication Sig Dispense Refill    neomycin-polymyxin-hydrocortisone 3.5-72656-5 Otic Suspension Place 3 drops in ear(s) 4 (four) times daily for 7 days.      Phenylephrine-Acetaminophen (SUDAFED PE PRESSURE + PAIN OR)       Loratadine 10 MG Oral Cap       Coenzyme Q10 (COQ10) 200 MG Oral Cap       Magnesium 125 MG Oral Cap        NON FORMULARY in the morning. retinal cream.      fluticasone propionate 50 MCG/ACT Nasal Suspension 2 sprays by Each Nare route daily. 1 each 1    Multiple Vitamin (MULTIVITAMIN ADULT OR) Take by mouth.      Etonogestrel (NEXPLANON) 68 MG Subcutaneous Implant Nexplanon 68 mg subdermal implant     [7]   Patient Active Problem List  Diagnosis    Coarctation of aorta (HCC)    Melanoma in situ of left lower extremity including hip (HCC)    Bicuspid aortic valve    Epiphora    Itchy eyes    Cardiac disease    Palpitations

## 2025-08-11 ENCOUNTER — HOSPITAL ENCOUNTER (OUTPATIENT)
Dept: ULTRASOUND IMAGING | Age: 35
Discharge: HOME | End: 2025-08-11
Attending: STUDENT IN AN ORGANIZED HEALTH CARE EDUCATION/TRAINING PROGRAM

## 2025-08-11 DIAGNOSIS — K76.0 FATTY LIVER: ICD-10-CM

## 2025-08-11 PROCEDURE — 76981 USE PARENCHYMA: CPT | Performed by: STUDENT IN AN ORGANIZED HEALTH CARE EDUCATION/TRAINING PROGRAM

## 2025-08-11 PROCEDURE — 76705 ECHO EXAM OF ABDOMEN: CPT | Performed by: STUDENT IN AN ORGANIZED HEALTH CARE EDUCATION/TRAINING PROGRAM

## 2025-08-14 ENCOUNTER — HOSPITAL ENCOUNTER (OUTPATIENT)
Dept: MAMMOGRAPHY | Facility: HOSPITAL | Age: 35
Discharge: HOME | End: 2025-08-14
Attending: NURSE PRACTITIONER

## 2025-08-14 DIAGNOSIS — N63.21 MASS OF UPPER OUTER QUADRANT OF LEFT BREAST: ICD-10-CM

## 2025-08-14 DIAGNOSIS — Z80.3 FAMILY HISTORY OF BREAST CANCER: ICD-10-CM

## 2025-08-14 DIAGNOSIS — N64.4 BREAST PAIN: ICD-10-CM

## 2025-08-14 PROCEDURE — 76642 ULTRASOUND BREAST LIMITED: CPT | Performed by: NURSE PRACTITIONER

## 2025-08-14 PROCEDURE — 77066 DX MAMMO INCL CAD BI: CPT | Performed by: NURSE PRACTITIONER

## 2025-08-14 PROCEDURE — 77062 BREAST TOMOSYNTHESIS BI: CPT | Performed by: NURSE PRACTITIONER
